# Patient Record
Sex: FEMALE | Race: BLACK OR AFRICAN AMERICAN | Employment: OTHER | URBAN - METROPOLITAN AREA
[De-identification: names, ages, dates, MRNs, and addresses within clinical notes are randomized per-mention and may not be internally consistent; named-entity substitution may affect disease eponyms.]

---

## 2017-11-15 ENCOUNTER — HOSPITAL ENCOUNTER (OUTPATIENT)
Dept: MAMMOGRAPHY | Age: 72
Discharge: HOME | End: 2017-11-15
Attending: FAMILY MEDICINE
Payer: MEDICARE

## 2017-11-15 DIAGNOSIS — Z12.39 SCREENING BREAST EXAMINATION: ICD-10-CM

## 2017-11-15 PROCEDURE — 77067 SCR MAMMO BI INCL CAD: CPT

## 2018-11-16 ENCOUNTER — HOSPITAL ENCOUNTER (OUTPATIENT)
Dept: MAMMOGRAPHY | Age: 73
Discharge: HOME | End: 2018-11-16
Attending: FAMILY MEDICINE
Payer: MEDICARE

## 2018-11-16 ENCOUNTER — HOSPITAL ENCOUNTER (OUTPATIENT)
Dept: BONE DENSITY | Age: 73
Discharge: HOME | End: 2018-11-16
Attending: FAMILY MEDICINE
Payer: MEDICARE

## 2018-11-16 DIAGNOSIS — Z12.31 VISIT FOR SCREENING MAMMOGRAM: ICD-10-CM

## 2018-11-16 DIAGNOSIS — M81.0 OSTEOPOROSIS: ICD-10-CM

## 2018-11-16 PROCEDURE — 77067 SCR MAMMO BI INCL CAD: CPT

## 2018-11-16 PROCEDURE — 77080 DXA BONE DENSITY AXIAL: CPT

## 2019-11-19 ENCOUNTER — HOSPITAL ENCOUNTER (OUTPATIENT)
Dept: MAMMOGRAPHY | Age: 74
Discharge: HOME | End: 2019-11-19
Attending: FAMILY MEDICINE
Payer: MEDICARE

## 2019-11-19 DIAGNOSIS — Z12.39 SCREENING BREAST EXAMINATION: ICD-10-CM

## 2019-11-19 PROCEDURE — 77067 SCR MAMMO BI INCL CAD: CPT

## 2020-10-16 ENCOUNTER — TRANSCRIBE ORDER (OUTPATIENT)
Dept: SCHEDULING | Age: 75
End: 2020-10-16

## 2020-10-16 DIAGNOSIS — Z12.31 VISIT FOR SCREENING MAMMOGRAM: Primary | ICD-10-CM

## 2020-10-16 DIAGNOSIS — M81.0 OSTEOPOROSIS: ICD-10-CM

## 2020-11-17 ENCOUNTER — HOSPITAL ENCOUNTER (OUTPATIENT)
Dept: BONE DENSITY | Age: 75
Discharge: HOME | End: 2020-11-17
Attending: FAMILY MEDICINE
Payer: MEDICARE

## 2020-11-17 DIAGNOSIS — Z12.31 VISIT FOR SCREENING MAMMOGRAM: ICD-10-CM

## 2020-11-17 DIAGNOSIS — M81.0 OSTEOPOROSIS: ICD-10-CM

## 2020-11-17 PROCEDURE — 77080 DXA BONE DENSITY AXIAL: CPT

## 2021-01-08 ENCOUNTER — HOSPITAL ENCOUNTER (OUTPATIENT)
Dept: MAMMOGRAPHY | Age: 76
Discharge: HOME | End: 2021-01-08
Attending: FAMILY MEDICINE
Payer: MEDICARE

## 2021-01-08 DIAGNOSIS — Z12.31 VISIT FOR SCREENING MAMMOGRAM: ICD-10-CM

## 2021-01-08 PROCEDURE — 77063 BREAST TOMOSYNTHESIS BI: CPT

## 2022-01-06 ENCOUNTER — TRANSCRIBE ORDER (OUTPATIENT)
Dept: SCHEDULING | Age: 77
End: 2022-01-06

## 2022-01-06 DIAGNOSIS — Z12.31 VISIT FOR SCREENING MAMMOGRAM: Primary | ICD-10-CM

## 2022-02-02 ENCOUNTER — HOSPITAL ENCOUNTER (OUTPATIENT)
Dept: MAMMOGRAPHY | Age: 77
Discharge: HOME | End: 2022-02-02
Attending: FAMILY MEDICINE
Payer: MEDICARE

## 2022-02-02 DIAGNOSIS — Z12.31 VISIT FOR SCREENING MAMMOGRAM: ICD-10-CM

## 2022-02-02 PROCEDURE — 77063 BREAST TOMOSYNTHESIS BI: CPT

## 2022-02-18 ENCOUNTER — HOSPITAL ENCOUNTER (OUTPATIENT)
Dept: PREADMISSION TESTING | Age: 77
Discharge: HOME | End: 2022-02-18

## 2022-04-24 ENCOUNTER — APPOINTMENT (OUTPATIENT)
Dept: GENERAL RADIOLOGY | Age: 77
DRG: 193 | End: 2022-04-24
Attending: EMERGENCY MEDICINE
Payer: MEDICARE

## 2022-04-24 ENCOUNTER — HOSPITAL ENCOUNTER (INPATIENT)
Age: 77
LOS: 3 days | Discharge: HOME | DRG: 193 | End: 2022-04-27
Attending: EMERGENCY MEDICINE | Admitting: HOSPITALIST
Payer: MEDICARE

## 2022-04-24 ENCOUNTER — APPOINTMENT (OUTPATIENT)
Dept: CT IMAGING | Age: 77
DRG: 193 | End: 2022-04-24
Attending: EMERGENCY MEDICINE
Payer: MEDICARE

## 2022-04-24 DIAGNOSIS — J11.1 INFLUENZA: ICD-10-CM

## 2022-04-24 DIAGNOSIS — J96.01 ACUTE RESPIRATORY FAILURE WITH HYPOXIA (HCC): ICD-10-CM

## 2022-04-24 DIAGNOSIS — R06.2 WHEEZING: ICD-10-CM

## 2022-04-24 DIAGNOSIS — J44.1 ACUTE EXACERBATION OF CHRONIC OBSTRUCTIVE PULMONARY DISEASE (COPD) (HCC): Primary | ICD-10-CM

## 2022-04-24 DIAGNOSIS — R06.02 SOB (SHORTNESS OF BREATH): ICD-10-CM

## 2022-04-24 DIAGNOSIS — R79.89 ELEVATED D-DIMER: ICD-10-CM

## 2022-04-24 DIAGNOSIS — E87.6 HYPOKALEMIA: ICD-10-CM

## 2022-04-24 LAB
ALBUMIN SERPL-MCNC: 3.7 G/DL (ref 3.5–5)
ALBUMIN/GLOB SERPL: 0.9 {RATIO} (ref 1.1–2.2)
ALP SERPL-CCNC: 149 U/L (ref 45–117)
ALT SERPL-CCNC: 52 U/L (ref 12–78)
ANION GAP SERPL CALC-SCNC: 7 MMOL/L (ref 5–15)
ARTERIAL PATENCY WRIST A: POSITIVE
AST SERPL-CCNC: 49 U/L (ref 15–37)
ATRIAL RATE: 75 BPM
BASE EXCESS BLD CALC-SCNC: 5.9 MMOL/L
BASOPHILS # BLD: 0 K/UL (ref 0–0.1)
BASOPHILS NFR BLD: 0 % (ref 0–1)
BDY SITE: ABNORMAL
BILIRUB SERPL-MCNC: 0.4 MG/DL (ref 0.2–1)
BNP SERPL-MCNC: 236 PG/ML
BUN SERPL-MCNC: 8 MG/DL (ref 6–20)
BUN/CREAT SERPL: 9 (ref 12–20)
CALCIUM SERPL-MCNC: 9.5 MG/DL (ref 8.5–10.1)
CALCULATED P AXIS, ECG09: 82 DEGREES
CALCULATED R AXIS, ECG10: 71 DEGREES
CALCULATED T AXIS, ECG11: 74 DEGREES
CHLORIDE SERPL-SCNC: 99 MMOL/L (ref 97–108)
CO2 SERPL-SCNC: 31 MMOL/L (ref 21–32)
COMMENT, HOLDF: NORMAL
COVID-19 RAPID TEST, COVR: NOT DETECTED
CREAT SERPL-MCNC: 0.89 MG/DL (ref 0.55–1.02)
CRP SERPL-MCNC: 5.52 MG/DL (ref 0–0.6)
D DIMER PPP FEU-MCNC: 1.42 MG/L FEU (ref 0–0.65)
DIAGNOSIS, 93000: NORMAL
DIFFERENTIAL METHOD BLD: ABNORMAL
EOSINOPHIL # BLD: 0 K/UL (ref 0–0.4)
EOSINOPHIL NFR BLD: 0 % (ref 0–7)
ERYTHROCYTE [DISTWIDTH] IN BLOOD BY AUTOMATED COUNT: 15.1 % (ref 11.5–14.5)
GAS FLOW.O2 O2 DELIVERY SYS: ABNORMAL L/MIN
GLOBULIN SER CALC-MCNC: 3.9 G/DL (ref 2–4)
GLUCOSE SERPL-MCNC: 119 MG/DL (ref 65–100)
HCO3 BLD-SCNC: 29.1 MMOL/L (ref 22–26)
HCT VFR BLD AUTO: 39.9 % (ref 35–47)
HGB BLD-MCNC: 13.2 G/DL (ref 11.5–16)
IMM GRANULOCYTES # BLD AUTO: 0 K/UL
IMM GRANULOCYTES NFR BLD AUTO: 0 %
LYMPHOCYTES # BLD: 2.2 K/UL (ref 0.8–3.5)
LYMPHOCYTES NFR BLD: 45 % (ref 12–49)
MCH RBC QN AUTO: 29.1 PG (ref 26–34)
MCHC RBC AUTO-ENTMCNC: 33.1 G/DL (ref 30–36.5)
MCV RBC AUTO: 87.9 FL (ref 80–99)
MONOCYTES # BLD: 0.4 K/UL (ref 0–1)
MONOCYTES NFR BLD: 8 % (ref 5–13)
NEUTS SEG # BLD: 2.3 K/UL (ref 1.8–8)
NEUTS SEG NFR BLD: 47 % (ref 32–75)
NRBC # BLD: 0 K/UL (ref 0–0.01)
NRBC BLD-RTO: 0 PER 100 WBC
P-R INTERVAL, ECG05: 264 MS
PCO2 BLD: 36.5 MMHG (ref 35–45)
PH BLD: 7.51 [PH] (ref 7.35–7.45)
PLATELET # BLD AUTO: 205 K/UL (ref 150–400)
PMV BLD AUTO: 10.7 FL (ref 8.9–12.9)
PO2 BLD: 67 MMHG (ref 80–100)
POTASSIUM SERPL-SCNC: 2.9 MMOL/L (ref 3.5–5.1)
PROCALCITONIN SERPL-MCNC: <0.05 NG/ML
PROT SERPL-MCNC: 7.6 G/DL (ref 6.4–8.2)
Q-T INTERVAL, ECG07: 404 MS
QRS DURATION, ECG06: 78 MS
QTC CALCULATION (BEZET), ECG08: 451 MS
RBC # BLD AUTO: 4.54 M/UL (ref 3.8–5.2)
RBC MORPH BLD: ABNORMAL
SAMPLES BEING HELD,HOLD: NORMAL
SAO2 % BLD: 94.8 % (ref 92–97)
SODIUM SERPL-SCNC: 137 MMOL/L (ref 136–145)
SOURCE, COVRS: NORMAL
SPECIMEN TYPE: ABNORMAL
TROPONIN-HIGH SENSITIVITY: 14 NG/L (ref 0–51)
VENTRICULAR RATE, ECG03: 75 BPM
WBC # BLD AUTO: 4.9 K/UL (ref 3.6–11)

## 2022-04-24 PROCEDURE — 86140 C-REACTIVE PROTEIN: CPT

## 2022-04-24 PROCEDURE — 93005 ELECTROCARDIOGRAM TRACING: CPT

## 2022-04-24 PROCEDURE — 94664 DEMO&/EVAL PT USE INHALER: CPT

## 2022-04-24 PROCEDURE — 71275 CT ANGIOGRAPHY CHEST: CPT

## 2022-04-24 PROCEDURE — 82803 BLOOD GASES ANY COMBINATION: CPT

## 2022-04-24 PROCEDURE — 99285 EMERGENCY DEPT VISIT HI MDM: CPT

## 2022-04-24 PROCEDURE — 36600 WITHDRAWAL OF ARTERIAL BLOOD: CPT

## 2022-04-24 PROCEDURE — 94640 AIRWAY INHALATION TREATMENT: CPT

## 2022-04-24 PROCEDURE — 74011250637 HC RX REV CODE- 250/637: Performed by: EMERGENCY MEDICINE

## 2022-04-24 PROCEDURE — 74011250637 HC RX REV CODE- 250/637: Performed by: NURSE PRACTITIONER

## 2022-04-24 PROCEDURE — 87635 SARS-COV-2 COVID-19 AMP PRB: CPT

## 2022-04-24 PROCEDURE — 74011000636 HC RX REV CODE- 636: Performed by: RADIOLOGY

## 2022-04-24 PROCEDURE — 74011250637 HC RX REV CODE- 250/637: Performed by: HOSPITALIST

## 2022-04-24 PROCEDURE — 84145 PROCALCITONIN (PCT): CPT

## 2022-04-24 PROCEDURE — 85025 COMPLETE CBC W/AUTO DIFF WBC: CPT

## 2022-04-24 PROCEDURE — 84484 ASSAY OF TROPONIN QUANT: CPT

## 2022-04-24 PROCEDURE — 77010033678 HC OXYGEN DAILY

## 2022-04-24 PROCEDURE — 83880 ASSAY OF NATRIURETIC PEPTIDE: CPT

## 2022-04-24 PROCEDURE — 71045 X-RAY EXAM CHEST 1 VIEW: CPT

## 2022-04-24 PROCEDURE — 74011250636 HC RX REV CODE- 250/636: Performed by: HOSPITALIST

## 2022-04-24 PROCEDURE — 74011000250 HC RX REV CODE- 250: Performed by: HOSPITALIST

## 2022-04-24 PROCEDURE — 85379 FIBRIN DEGRADATION QUANT: CPT

## 2022-04-24 PROCEDURE — 74011250636 HC RX REV CODE- 250/636: Performed by: EMERGENCY MEDICINE

## 2022-04-24 PROCEDURE — 36415 COLL VENOUS BLD VENIPUNCTURE: CPT

## 2022-04-24 PROCEDURE — 65270000029 HC RM PRIVATE

## 2022-04-24 PROCEDURE — 80053 COMPREHEN METABOLIC PANEL: CPT

## 2022-04-24 PROCEDURE — 96374 THER/PROPH/DIAG INJ IV PUSH: CPT

## 2022-04-24 PROCEDURE — 74011000250 HC RX REV CODE- 250: Performed by: EMERGENCY MEDICINE

## 2022-04-24 RX ORDER — ACETAMINOPHEN 325 MG/1
650 TABLET ORAL
Status: DISCONTINUED | OUTPATIENT
Start: 2022-04-24 | End: 2022-04-27 | Stop reason: HOSPADM

## 2022-04-24 RX ORDER — POTASSIUM CHLORIDE 750 MG/1
40 TABLET, FILM COATED, EXTENDED RELEASE ORAL
Status: COMPLETED | OUTPATIENT
Start: 2022-04-24 | End: 2022-04-24

## 2022-04-24 RX ORDER — GUAIFENESIN 600 MG/1
1200 TABLET, EXTENDED RELEASE ORAL EVERY 12 HOURS
Status: DISCONTINUED | OUTPATIENT
Start: 2022-04-24 | End: 2022-04-27 | Stop reason: HOSPADM

## 2022-04-24 RX ORDER — SODIUM CHLORIDE 0.9 % (FLUSH) 0.9 %
5-40 SYRINGE (ML) INJECTION AS NEEDED
Status: DISCONTINUED | OUTPATIENT
Start: 2022-04-24 | End: 2022-04-27 | Stop reason: HOSPADM

## 2022-04-24 RX ORDER — ONDANSETRON 2 MG/ML
4 INJECTION INTRAMUSCULAR; INTRAVENOUS
Status: DISCONTINUED | OUTPATIENT
Start: 2022-04-24 | End: 2022-04-27 | Stop reason: HOSPADM

## 2022-04-24 RX ORDER — ONDANSETRON 4 MG/1
4 TABLET, ORALLY DISINTEGRATING ORAL
Status: DISCONTINUED | OUTPATIENT
Start: 2022-04-24 | End: 2022-04-27 | Stop reason: HOSPADM

## 2022-04-24 RX ORDER — POLYETHYLENE GLYCOL 3350 17 G/17G
17 POWDER, FOR SOLUTION ORAL DAILY PRN
Status: DISCONTINUED | OUTPATIENT
Start: 2022-04-24 | End: 2022-04-27 | Stop reason: HOSPADM

## 2022-04-24 RX ORDER — BENZONATATE 100 MG/1
200 CAPSULE ORAL EVERY 8 HOURS
Status: DISCONTINUED | OUTPATIENT
Start: 2022-04-24 | End: 2022-04-27 | Stop reason: HOSPADM

## 2022-04-24 RX ORDER — OSELTAMIVIR PHOSPHATE 75 MG/1
75 CAPSULE ORAL 2 TIMES DAILY
Status: DISCONTINUED | OUTPATIENT
Start: 2022-04-24 | End: 2022-04-25

## 2022-04-24 RX ORDER — ALBUTEROL SULFATE 90 UG/1
2 AEROSOL, METERED RESPIRATORY (INHALATION)
Status: DISCONTINUED | OUTPATIENT
Start: 2022-04-24 | End: 2022-04-24

## 2022-04-24 RX ORDER — DEXAMETHASONE SODIUM PHOSPHATE 4 MG/ML
10 INJECTION, SOLUTION INTRA-ARTICULAR; INTRALESIONAL; INTRAMUSCULAR; INTRAVENOUS; SOFT TISSUE
Status: COMPLETED | OUTPATIENT
Start: 2022-04-24 | End: 2022-04-24

## 2022-04-24 RX ORDER — ARFORMOTEROL TARTRATE 15 UG/2ML
15 SOLUTION RESPIRATORY (INHALATION)
Status: DISCONTINUED | OUTPATIENT
Start: 2022-04-24 | End: 2022-04-27 | Stop reason: HOSPADM

## 2022-04-24 RX ORDER — BUDESONIDE 0.5 MG/2ML
500 INHALANT ORAL
Status: DISCONTINUED | OUTPATIENT
Start: 2022-04-24 | End: 2022-04-27 | Stop reason: HOSPADM

## 2022-04-24 RX ORDER — HYDRALAZINE HYDROCHLORIDE 10 MG/1
10 TABLET, FILM COATED ORAL
Status: DISCONTINUED | OUTPATIENT
Start: 2022-04-24 | End: 2022-04-27 | Stop reason: HOSPADM

## 2022-04-24 RX ORDER — ACETAMINOPHEN 650 MG/1
650 SUPPOSITORY RECTAL
Status: DISCONTINUED | OUTPATIENT
Start: 2022-04-24 | End: 2022-04-27 | Stop reason: HOSPADM

## 2022-04-24 RX ORDER — SODIUM CHLORIDE 0.9 % (FLUSH) 0.9 %
5-40 SYRINGE (ML) INJECTION EVERY 8 HOURS
Status: DISCONTINUED | OUTPATIENT
Start: 2022-04-24 | End: 2022-04-27 | Stop reason: HOSPADM

## 2022-04-24 RX ORDER — IPRATROPIUM BROMIDE AND ALBUTEROL SULFATE 2.5; .5 MG/3ML; MG/3ML
3 SOLUTION RESPIRATORY (INHALATION)
Status: DISCONTINUED | OUTPATIENT
Start: 2022-04-24 | End: 2022-04-26

## 2022-04-24 RX ORDER — ENOXAPARIN SODIUM 100 MG/ML
40 INJECTION SUBCUTANEOUS DAILY
Status: DISCONTINUED | OUTPATIENT
Start: 2022-04-25 | End: 2022-04-27 | Stop reason: HOSPADM

## 2022-04-24 RX ADMIN — BENZONATATE 200 MG: 100 CAPSULE ORAL at 15:51

## 2022-04-24 RX ADMIN — OSELTAMIVIR PHOSPHATE 75 MG: 75 CAPSULE ORAL at 19:03

## 2022-04-24 RX ADMIN — ARFORMOTEROL TARTRATE 15 MCG: 15 SOLUTION RESPIRATORY (INHALATION) at 20:46

## 2022-04-24 RX ADMIN — SODIUM CHLORIDE, PRESERVATIVE FREE 10 ML: 5 INJECTION INTRAVENOUS at 15:51

## 2022-04-24 RX ADMIN — AZITHROMYCIN DIHYDRATE 500 MG: 500 INJECTION, POWDER, LYOPHILIZED, FOR SOLUTION INTRAVENOUS at 15:51

## 2022-04-24 RX ADMIN — IOPAMIDOL 80 ML: 755 INJECTION, SOLUTION INTRAVENOUS at 14:47

## 2022-04-24 RX ADMIN — METHYLPREDNISOLONE SODIUM SUCCINATE 60 MG: 40 INJECTION, POWDER, FOR SOLUTION INTRAMUSCULAR; INTRAVENOUS at 15:51

## 2022-04-24 RX ADMIN — HYDRALAZINE HYDROCHLORIDE 10 MG: 10 TABLET, FILM COATED ORAL at 21:42

## 2022-04-24 RX ADMIN — ALBUTEROL SULFATE 1 DOSE: 2.5 SOLUTION RESPIRATORY (INHALATION) at 15:34

## 2022-04-24 RX ADMIN — BUDESONIDE 500 MCG: 0.5 INHALANT RESPIRATORY (INHALATION) at 20:46

## 2022-04-24 RX ADMIN — METHYLPREDNISOLONE SODIUM SUCCINATE 60 MG: 40 INJECTION, POWDER, FOR SOLUTION INTRAMUSCULAR; INTRAVENOUS at 21:42

## 2022-04-24 RX ADMIN — IPRATROPIUM BROMIDE AND ALBUTEROL SULFATE 3 ML: .5; 3 SOLUTION RESPIRATORY (INHALATION) at 20:46

## 2022-04-24 RX ADMIN — ALBUTEROL SULFATE 1 DOSE: 2.5 SOLUTION RESPIRATORY (INHALATION) at 12:46

## 2022-04-24 RX ADMIN — BENZONATATE 200 MG: 100 CAPSULE ORAL at 21:42

## 2022-04-24 RX ADMIN — SODIUM CHLORIDE, PRESERVATIVE FREE 10 ML: 5 INJECTION INTRAVENOUS at 21:43

## 2022-04-24 RX ADMIN — POTASSIUM CHLORIDE 40 MEQ: 750 TABLET, EXTENDED RELEASE ORAL at 13:47

## 2022-04-24 RX ADMIN — DEXAMETHASONE SODIUM PHOSPHATE 10 MG: 4 INJECTION, SOLUTION INTRAMUSCULAR; INTRAVENOUS at 12:45

## 2022-04-24 RX ADMIN — GUAIFENESIN 1200 MG: 600 TABLET, EXTENDED RELEASE ORAL at 21:42

## 2022-04-24 NOTE — ED NOTES
Has a final transfer review been performed? Yes    Reason for Admission: Flu +, respiratory distress  Patient comes from: Home  Mental Status: Alert and oriented  ADL:partial assistance  Ambulation:mild difficulty, patient gets SOB on exertion but steady on feet  Pertinent Info/Safety Concerns: Patient is on 1L nasal cannula  COVID Status: Comments: rapid COVID pending  MEWS Score: 1  Vitals:    04/24/22 1322 04/24/22 1337 04/24/22 1534 04/24/22 1558   BP: (!) 143/83 (!) 148/87  (!) 153/92   Pulse: 77 83  77   Resp: 21 19 18   Temp:    98.7 °F (37.1 °C)   SpO2: 93% 94% 95% 91%   Weight:       Height:         Transport mode:ED stretcher    TRANSFER - OUT REPORT:    Report given to 6E RN(name) on Rogelio Levy  being transferred to 6E(unit) for routine progression of care       Report consisted of patient's Situation, Background, Assessment and   Recommendations(SBAR). Information from the following report(s) ED Summary was reviewed with the receiving nurse. Lines:   Peripheral IV 04/24/22 Right Antecubital (Active)   Site Assessment Clean, dry, & intact 04/24/22 1227   Phlebitis Assessment 0 04/24/22 1227   Infiltration Assessment 0 04/24/22 1227   Dressing Status Clean, dry, & intact 04/24/22 1227        Opportunity for questions and clarification was provided.

## 2022-04-24 NOTE — PROGRESS NOTES
TRANSFER - IN REPORT:    Verbal report received from ED Thru-put Note on Bradford Marts  being received from ED(unit) for routine progression of care      Report consisted of patients Situation, Background, Assessment and   Recommendations(SBAR). Information from the following report(s) SBAR and Kardex was reviewed with the receiving nurse. Opportunity for questions and clarification was provided. Assessment completed upon patients arrival to unit and care assumed.

## 2022-04-24 NOTE — ED TRIAGE NOTES
Pt arrives via wheelchair c/o (saw PCP Wed) c/o headache, cough, neg COVID, FLU +. Today cc is SOB. Pt endorses smoking less than pack/week. Pt 91% in triage on ra. Plt placed on 2L, now 95%. Pt AOx4.

## 2022-04-24 NOTE — ED PROVIDER NOTES
48-year-old female with history of COPD, on no home O2, presents to the emergency department stating that last Wednesday she was diagnosed with influenza. At that time she was COVID-negative and she was presenting with headache, cough, myalgias and diarrhea. With this cough she has had progressively worsening shortness of breath and wheezing unalleviated by her home albuterol inhalers. Denies any nausea, vomiting, chest pain, fever, rhinorrhea or sore throat. No past medical history on file. Past Surgical History:   Procedure Laterality Date    COLONOSCOPY N/A 9/20/2016    COLONOSCOPY performed by Portia Bagley MD at [address] [facility]; HI RISK IND  9/20/2016         HX HYSTERECTOMY      HX OTHER SURGICAL      colonoscopy    [facility] COLSC FLX W/RMVL OF TUMOR POLYP LESION SNARE TQ  7/20/2011              Family History:   Problem Relation Age of Onset    Heart Disease Mother     Other Father         AAA    Cancer Brother        Social History     Socioeconomic History    Marital status:      Spouse name: Not on file    Number of children: Not on file    Years of education: Not on file    Highest education level: Not on file   Occupational History    Not on file   Tobacco Use    Smoking status: Current Every Day Smoker     Packs/day: 0.50     Years: 20.00     Pack years: 10.00    Smokeless tobacco: Never Used   Substance and Sexual Activity    Alcohol use: No    Drug use: No    Sexual activity: Not on file   Other Topics Concern    Not on file   Social History Narrative    Not on file     Social Determinants of Health     Financial Resource Strain:     Difficulty of Paying Living Expenses: Not on file   Food Insecurity:     Worried About Running Out of Food in the Last Year: Not on file    Carey of Food in the Last Year: Not on file   Transportation Needs:     Lack of Transportation (Medical):  Not on file    Lack of Transportation (Non-Medical): Not on file   Physical Activity:     Days of Exercise per Week: Not on file    Minutes of Exercise per Session: Not on file   Stress:     Feeling of Stress : Not on file   Social Connections:     Frequency of Communication with Friends and Family: Not on file    Frequency of Social Gatherings with Friends and Family: Not on file    Attends Zoroastrianism Services: Not on file    Active Member of [address] [place] [address] or Organizations: Not on file    Attends Club or Organization Meetings: Not on file    Marital Status: Not on file   Intimate Partner Violence:     Fear of Current or Ex-Partner: Not on file    Emotionally Abused: Not on file    Physically Abused: Not on file    Sexually Abused: Not on file   Housing Stability:     Unable to Pay for Housing in the Last Year: Not on file    Number of [place] in the Last Year: Not on file    Unstable Housing in the Last Year: Not on file         ALLERGIES: Patient has no known allergies. Review of Systems   Constitutional: Positive for activity change. Negative for appetite change, chills and fever. HENT: Negative for congestion, rhinorrhea, sinus pressure, sneezing and sore throat. Eyes: Negative for photophobia and visual disturbance. Respiratory: Positive for cough, chest tightness, shortness of breath and wheezing. Cardiovascular: Negative for chest pain. Gastrointestinal: Positive for diarrhea. Negative for abdominal pain, blood in stool, constipation, nausea and vomiting. Genitourinary: Negative for difficulty urinating, dysuria, flank pain, frequency, hematuria, menstrual problem, urgency, vaginal bleeding and vaginal discharge. Musculoskeletal: Positive for myalgias. Negative for arthralgias, back pain and neck pain. Skin: Negative for rash and wound. Neurological: Positive for headaches. Negative for syncope, weakness and numbness. Psychiatric/Behavioral: Negative for self-injury and suicidal ideas.    All other systems reviewed and are negative. Vitals:    04/24/22 1207   BP: (!) 142/87   Pulse: 77   Resp: 16   Temp: 98 °F (36.7 °C)   SpO2: 91%   Weight: 59 kg (130 lb)   Height: 5' 6\" (1.676 m)            Physical Exam  Vitals and nursing note reviewed. Constitutional:       General: She is not in acute distress. Appearance: Normal appearance. She is well-developed. She is ill-appearing. She is not diaphoretic. HENT:      Head: Normocephalic and atraumatic. Nose: Nose normal.   Eyes:      Extraocular Movements: Extraocular movements intact. Conjunctiva/sclera: Conjunctivae normal.      Pupils: Pupils are equal, round, and reactive to light. Cardiovascular:      Rate and Rhythm: Normal rate and regular rhythm. Heart sounds: Normal heart sounds. Pulmonary:      Effort: Pulmonary effort is normal.      Breath sounds: Decreased breath sounds and wheezing present. Abdominal:      General: There is no distension. Palpations: Abdomen is soft. Tenderness: There is no abdominal tenderness. Musculoskeletal:         General: No tenderness. Cervical back: Neck supple. Right lower leg: No tenderness. No edema. Left lower leg: No edema. Skin:     General: Skin is warm and dry. Neurological:      General: No focal deficit present. Mental Status: She is alert and oriented to person, place, and time. Cranial Nerves: No cranial nerve deficit. Sensory: No sensory deficit. Motor: No weakness. Coordination: Coordination normal.          MDM   57-year-old female presents with worsening cough, shortness of breath, wheezing with recent diagnosed with influenza last week. She was seen to desaturate to 88% on room air and placed on nasal cannula O2 with improvement up into the mid 90s. Labs returned showing pH 7.51, CO2 36.5, O2 67, bicarb 29.1, no leukocytosis or anemia, K low at 2.9, will replete orally, negative troponin and BNP, with elevated D-dimer 1.42.   We will pursue CTA chest to further evaluate. Elevated CRP at 5.52. Procedures  7033 EKG shows normal sinus rhythm with a rate of 75 bpm with PACs no acute ST or T wave abnormalities suggestive of ischemia. Perfect Serve Consult for Admission  2:58 PM    ED Room Number: RL65/17  Patient Name and age:  Pedro Nearing 67 y.o.  female  Working Diagnosis:   1. Acute exacerbation of chronic obstructive pulmonary disease (COPD) ([address]    2. Influenza    3. SOB (shortness of breath)    4. Wheezing    5. Elevated d-dimer    6. Acute respiratory failure with hypoxia (HCC)    7. Hypokalemia        COVID-19 Suspicion:  no  Sepsis present:  no  Reassessment needed: no  Code Status:  Full Code  Readmission: no  Isolation Requirements:  yes  Recommended Level of Care:  telemetry  Department:[facility] ED - 21   Other:  67 y.o. female dx with the flu last week, covid neg, presents with worsening shortness of breath, wheezing consistent with COPD type exacerbation. Given DuoNeb and IV Decadron. Found to be hypoxic on room air to 88, stable on nasal cannula O2. Mildly elevated D-dimer, CTA chest ordered.

## 2022-04-24 NOTE — H&P
[facility] Adult  Hospitalist Group  History and Physical    Date of Service:  4/24/2022  Primary Care Provider: Karan Multani MD  Source of information: The patient    Chief Complaint: Shortness of Breath      History of Presenting Illness:   [place] is a 67 y.o. female who presents with SOB     65-year-old -American female active smoking, history of COPD arrives via wheelchair c/o (saw PCP Wed) c/o headache, cough, and shortness breath. Patient said during the Easter weekend, met her family member one of her sisters had a flu, and after that then she also developed a chills, dry cough, she went to her PCPs office on Wednesday, tested neg COVID, FLU +. She was giving Tamiflu . Denied any fever, but diffuse increasing coughing, and today feels increasing shortness of breath SOB. Pt endorses smoking less than pack/week. Her last smoking was 2 days ago . pt 91% in triage on ra. Plt placed on 2L, now 95%. Pt AOx4. Efrain Srivastava REVIEW OF SYSTEMS:  General: HPI, no changes of weight  HEENT: no headache, no vision changes, no nose discharge, no hearing changes   RES: HPI,   CVS: no cp, no palpitation. Muscular: no joint swelling, no muscle pain, no leg swelling  Skin: no rash, no itching   GI: no vomiting, mild diarrhea last 2-3 days, no subsided   : no dysuria, no hematuria  Hemo: no gum bleeding, no petechial   Neuro: no sensation changes, no focal weakness   Endo: no polydipsia   Psych: denied depression     No past medical history on file. Past Surgical History:   Procedure Laterality Date    COLONOSCOPY N/A 9/20/2016    COLONOSCOPY performed by Theodora Prieto MD at [facility]; HI RISK IND  9/20/2016         HX HYSTERECTOMY      HX OTHER SURGICAL      colonoscopy    [facility] COLSC FLX W/RMVL OF TUMOR POLYP LESION SNARE TQ  7/20/2011          Prior to Admission medications    Medication Sig Start Date End Date Taking?  Authorizing Provider albuterol (VENTOLIN HFA) 90 mcg/actuation inhaler Take 2 Puffs by inhalation every four (4) hours as needed for Wheezing. Provider, Historical     No Known Allergies   Family History   Problem Relation Age of Onset    Heart Disease Mother     Other Father         AAA    Cancer Brother       Social History:  reports that she has been smoking. She has a 10.00 pack-year smoking history. She has never used smokeless tobacco. She reports that she does not drink alcohol and does not use drugs. Family and social history were personally reviewed, all pertinent and relevant details are outlined as above. Objective:     Visit Vitals  BP (!) 148/87   Pulse 83   Temp 98 °F (36.7 °C)   Resp 19   Ht 5' 6\" (1.676 m)   Wt 59 kg (130 lb)   SpO2 94%   BMI 20.98 kg/m²    O2 Flow Rate (L/min): 1 l/min O2 Device: Nasal cannula    PHYSICAL EXAM:   General: Alert x oriented x 3, awake, no acute distresss   HEENT: PEERL, EOMI, moist mucus membranes  Neck: Supple, no JVD, no meningeal signs  Chest:AE ok, course bs, no wheezing. CVS: RRR, S1 S2 heard, no murmurs/rubs/gallops  Abd: Soft, non-tender, non-distended, +bowel sounds   Ext: No clubbing, no cyanosis, no edema  Neuro/Psych: Pleasant mood and affect, CN 2-12 grossly intact, sensory grossly within normal limit, Strength 5/5 in all extremities, DTR 1+ x 4  Cap refill: Brisk, less than 3 seconds  Pulses: 2+, symmetric in all extremities  Skin: Warm, dry, without rashes or lesions    Data Review: All diagnostic labs and studies have been reviewed. Abnormal Labs Reviewed   CBC WITH AUTOMATED DIFF - Abnormal; Notable for the following components:       Result Value    RDW 15.1 (*)     All other components within normal limits   METABOLIC PANEL, COMPREHENSIVE - Abnormal; Notable for the following components:    Potassium 2.9 (*)     Glucose 119 (*)     BUN/Creatinine ratio 9 (*)     AST (SGOT) 49 (*)     Alk.  phosphatase 149 (*)     A-G Ratio 0.9 (*)     All other components within normal limits   D DIMER - Abnormal; Notable for the following components:    D-dimer 1.42 (*)     All other components within normal limits   C REACTIVE PROTEIN, QT - Abnormal; Notable for the following components:    C-Reactive protein 5.52 (*)     All other components within normal limits   POC G3 - PUL - Abnormal; Notable for the following components:    pH (POC) 7.51 (*)     pO2 (POC) 67 (*)     HCO3 (POC) 29.1 (*)     All other components within normal limits       All Micro Results     Procedure Component Value Units Date/Time    COVID-19 WITH INFLUENZA A/B [[id number]]     Order Status: Sent Specimen: Nasopharyngeal           IMAGING:   XR CHEST PORT   Final Result   Pulmonary edema favored over atypical infection. CTA CHEST W OR W WO CONT    (Results Pending)        ECG/ECHO:    Results for orders placed or performed during the hospital encounter of 04/24/22   EKG, 12 LEAD, INITIAL   Result Value Ref Range    Ventricular Rate 75 BPM    Atrial Rate 75 BPM    P-R Interval 264 ms    QRS Duration 78 ms    Q-T Interval 404 ms    QTC Calculation (Bezet) 451 ms    Calculated P Axis 82 degrees    Calculated R Axis 71 degrees    Calculated T Axis 74 degrees    Diagnosis       Sinus rhythm with 1st degree AV block with premature supraventricular   complexes  Minimal voltage criteria for LVH, may be normal variant ( Sokolow-Lucio )  Borderline ECG  When compared with ECG of 12-OCT-2006 10:33,  premature supraventricular complexes are now present          Assessment:   Given the patient's current clinical presentation, there is a high level of concern for decompensation if discharged from the emergency department. Complex decision making was performed, which includes reviewing the patient's available past medical records, laboratory results, and imaging studies. Active Problems:    COPD exacerbation ([facility] (4/24/2022)      Plan:     1.  COPD exacerbation: due to flu, will continue tamiflu, will repeat COVID 19 due to the pandemic. Pt is fully vaccinated and also has booster. Start azithromycin, iv solumedrol. Albuterol. Mucinex. 2. Hypokalemia: give po kcl. And follow         DIET: ADULT DIET Regular   ISOLATION PRECAUTIONS: There are currently no Active Isolations  CODE STATUS: Full Code   DVT PROPHYLAXIS: SCDs  FUNCTIONAL STATUS PRIOR TO HOSPITALIZATION: Fully active and ambulatory; able to carry on all self-care without restriction. EARLY MOBILITY ASSESSMENT: Recommend routine ambulation while hospitalized with the assistance of nursing staff  ANTICIPATED DISCHARGE: 24-48 hours. EMERGENCY CONTACT/SURROGATE DECISION MAKER: pt makes her own decision     CRITICAL CARE WAS PERFORMED FOR THIS ENCOUNTER: NO.      Signed By: Kevin River MD     April 24, 2022         Please note that this dictation may have been completed with Dragon, the [employer] voice recognition software. Quite often unanticipated grammatical, syntax, homophones, and other interpretive errors are inadvertently transcribed by the computer software. Please disregard these errors. Please excuse any errors that have escaped final proofreading.

## 2022-04-25 LAB
ALBUMIN SERPL-MCNC: 3.2 G/DL (ref 3.5–5)
ALBUMIN/GLOB SERPL: 0.8 {RATIO} (ref 1.1–2.2)
ALP SERPL-CCNC: 144 U/L (ref 45–117)
ALT SERPL-CCNC: 47 U/L (ref 12–78)
ANION GAP SERPL CALC-SCNC: 7 MMOL/L (ref 5–15)
AST SERPL-CCNC: 33 U/L (ref 15–37)
BASOPHILS # BLD: 0 K/UL (ref 0–0.1)
BASOPHILS NFR BLD: 0 % (ref 0–1)
BILIRUB SERPL-MCNC: 0.4 MG/DL (ref 0.2–1)
BUN SERPL-MCNC: 12 MG/DL (ref 6–20)
BUN/CREAT SERPL: 16 (ref 12–20)
CALCIUM SERPL-MCNC: 9.3 MG/DL (ref 8.5–10.1)
CHLORIDE SERPL-SCNC: 103 MMOL/L (ref 97–108)
CO2 SERPL-SCNC: 28 MMOL/L (ref 21–32)
CREAT SERPL-MCNC: 0.77 MG/DL (ref 0.55–1.02)
DIFFERENTIAL METHOD BLD: ABNORMAL
EOSINOPHIL # BLD: 0 K/UL (ref 0–0.4)
EOSINOPHIL NFR BLD: 0 % (ref 0–7)
ERYTHROCYTE [DISTWIDTH] IN BLOOD BY AUTOMATED COUNT: 14.9 % (ref 11.5–14.5)
GLOBULIN SER CALC-MCNC: 4 G/DL (ref 2–4)
GLUCOSE SERPL-MCNC: 178 MG/DL (ref 65–100)
HCT VFR BLD AUTO: 38 % (ref 35–47)
HGB BLD-MCNC: 12.4 G/DL (ref 11.5–16)
IMM GRANULOCYTES # BLD AUTO: 0 K/UL
IMM GRANULOCYTES NFR BLD AUTO: 0 %
LYMPHOCYTES # BLD: 0.6 K/UL (ref 0.8–3.5)
LYMPHOCYTES NFR BLD: 19 % (ref 12–49)
MAGNESIUM SERPL-MCNC: 2 MG/DL (ref 1.6–2.4)
MCH RBC QN AUTO: 28.8 PG (ref 26–34)
MCHC RBC AUTO-ENTMCNC: 32.6 G/DL (ref 30–36.5)
MCV RBC AUTO: 88.2 FL (ref 80–99)
MONOCYTES # BLD: 0 K/UL (ref 0–1)
MONOCYTES NFR BLD: 1 % (ref 5–13)
NEUTS SEG # BLD: 2.6 K/UL (ref 1.8–8)
NEUTS SEG NFR BLD: 80 % (ref 32–75)
NRBC # BLD: 0 K/UL (ref 0–0.01)
NRBC BLD-RTO: 0 PER 100 WBC
PHOSPHATE SERPL-MCNC: 2 MG/DL (ref 2.6–4.7)
PLATELET # BLD AUTO: 221 K/UL (ref 150–400)
PMV BLD AUTO: 10.5 FL (ref 8.9–12.9)
POTASSIUM SERPL-SCNC: 3.6 MMOL/L (ref 3.5–5.1)
PROT SERPL-MCNC: 7.2 G/DL (ref 6.4–8.2)
RBC # BLD AUTO: 4.31 M/UL (ref 3.8–5.2)
RBC MORPH BLD: ABNORMAL
SODIUM SERPL-SCNC: 138 MMOL/L (ref 136–145)
WBC # BLD AUTO: 3.2 K/UL (ref 3.6–11)
WBC MORPH BLD: ABNORMAL

## 2022-04-25 PROCEDURE — 36415 COLL VENOUS BLD VENIPUNCTURE: CPT

## 2022-04-25 PROCEDURE — 74011250636 HC RX REV CODE- 250/636: Performed by: INTERNAL MEDICINE

## 2022-04-25 PROCEDURE — 94640 AIRWAY INHALATION TREATMENT: CPT

## 2022-04-25 PROCEDURE — 84100 ASSAY OF PHOSPHORUS: CPT

## 2022-04-25 PROCEDURE — 65270000029 HC RM PRIVATE

## 2022-04-25 PROCEDURE — 83735 ASSAY OF MAGNESIUM: CPT

## 2022-04-25 PROCEDURE — 80053 COMPREHEN METABOLIC PANEL: CPT

## 2022-04-25 PROCEDURE — 74011000250 HC RX REV CODE- 250: Performed by: HOSPITALIST

## 2022-04-25 PROCEDURE — 94760 N-INVAS EAR/PLS OXIMETRY 1: CPT

## 2022-04-25 PROCEDURE — 85025 COMPLETE CBC W/AUTO DIFF WBC: CPT

## 2022-04-25 PROCEDURE — 74011250637 HC RX REV CODE- 250/637: Performed by: INTERNAL MEDICINE

## 2022-04-25 PROCEDURE — 74011250636 HC RX REV CODE- 250/636: Performed by: HOSPITALIST

## 2022-04-25 PROCEDURE — 74011250637 HC RX REV CODE- 250/637: Performed by: HOSPITALIST

## 2022-04-25 RX ORDER — QUETIAPINE FUMARATE 25 MG/1
25 TABLET, FILM COATED ORAL ONCE
Status: DISCONTINUED | OUTPATIENT
Start: 2022-04-25 | End: 2022-04-25

## 2022-04-25 RX ORDER — OSELTAMIVIR PHOSPHATE 30 MG/1
30 CAPSULE ORAL 2 TIMES DAILY
Status: DISCONTINUED | OUTPATIENT
Start: 2022-04-25 | End: 2022-04-27 | Stop reason: HOSPADM

## 2022-04-25 RX ADMIN — ENOXAPARIN SODIUM 40 MG: 100 INJECTION SUBCUTANEOUS at 09:09

## 2022-04-25 RX ADMIN — BENZONATATE 200 MG: 100 CAPSULE ORAL at 21:19

## 2022-04-25 RX ADMIN — AZITHROMYCIN DIHYDRATE 500 MG: 500 INJECTION, POWDER, LYOPHILIZED, FOR SOLUTION INTRAVENOUS at 17:22

## 2022-04-25 RX ADMIN — BUDESONIDE 500 MCG: 0.5 INHALANT RESPIRATORY (INHALATION) at 07:19

## 2022-04-25 RX ADMIN — IPRATROPIUM BROMIDE AND ALBUTEROL SULFATE 3 ML: .5; 3 SOLUTION RESPIRATORY (INHALATION) at 07:19

## 2022-04-25 RX ADMIN — METHYLPREDNISOLONE SODIUM SUCCINATE 60 MG: 40 INJECTION, POWDER, FOR SOLUTION INTRAMUSCULAR; INTRAVENOUS at 09:09

## 2022-04-25 RX ADMIN — ARFORMOTEROL TARTRATE 15 MCG: 15 SOLUTION RESPIRATORY (INHALATION) at 07:19

## 2022-04-25 RX ADMIN — SODIUM CHLORIDE, PRESERVATIVE FREE 10 ML: 5 INJECTION INTRAVENOUS at 05:16

## 2022-04-25 RX ADMIN — ARFORMOTEROL TARTRATE 15 MCG: 15 SOLUTION RESPIRATORY (INHALATION) at 20:49

## 2022-04-25 RX ADMIN — OSELTAMIVIR PHOSPHATE 30 MG: 30 CAPSULE ORAL at 17:26

## 2022-04-25 RX ADMIN — IPRATROPIUM BROMIDE AND ALBUTEROL SULFATE 3 ML: .5; 3 SOLUTION RESPIRATORY (INHALATION) at 02:00

## 2022-04-25 RX ADMIN — OSELTAMIVIR PHOSPHATE 30 MG: 30 CAPSULE ORAL at 09:06

## 2022-04-25 RX ADMIN — METHYLPREDNISOLONE SODIUM SUCCINATE 40 MG: 40 INJECTION, POWDER, FOR SOLUTION INTRAMUSCULAR; INTRAVENOUS at 21:19

## 2022-04-25 RX ADMIN — ACETAMINOPHEN 650 MG: 325 TABLET ORAL at 09:08

## 2022-04-25 RX ADMIN — GUAIFENESIN 1200 MG: 600 TABLET, EXTENDED RELEASE ORAL at 09:08

## 2022-04-25 RX ADMIN — SODIUM CHLORIDE, PRESERVATIVE FREE 10 ML: 5 INJECTION INTRAVENOUS at 09:11

## 2022-04-25 RX ADMIN — GUAIFENESIN 1200 MG: 600 TABLET, EXTENDED RELEASE ORAL at 21:19

## 2022-04-25 RX ADMIN — BENZONATATE 200 MG: 100 CAPSULE ORAL at 05:18

## 2022-04-25 RX ADMIN — SODIUM CHLORIDE, PRESERVATIVE FREE 10 ML: 5 INJECTION INTRAVENOUS at 21:21

## 2022-04-25 RX ADMIN — SODIUM CHLORIDE, PRESERVATIVE FREE 10 ML: 5 INJECTION INTRAVENOUS at 17:23

## 2022-04-25 RX ADMIN — BUDESONIDE 500 MCG: 0.5 INHALANT RESPIRATORY (INHALATION) at 20:49

## 2022-04-25 RX ADMIN — METHYLPREDNISOLONE SODIUM SUCCINATE 60 MG: 40 INJECTION, POWDER, FOR SOLUTION INTRAMUSCULAR; INTRAVENOUS at 05:16

## 2022-04-25 RX ADMIN — IPRATROPIUM BROMIDE AND ALBUTEROL SULFATE 3 ML: .5; 3 SOLUTION RESPIRATORY (INHALATION) at 13:50

## 2022-04-25 RX ADMIN — IPRATROPIUM BROMIDE AND ALBUTEROL SULFATE 3 ML: .5; 3 SOLUTION RESPIRATORY (INHALATION) at 20:49

## 2022-04-25 NOTE — PROGRESS NOTES
[facility] Adult  Hospitalist Group                                                                                          Hospitalist Progress Note  [address] [facility],   Answering service: [phone] from in house phone        Date of Service:  2022  NAME:  Cathy Talley  :  1945  MRN:  [id number]      Admission Summary:   \"76 y.o. female who presents with SOB. 65-year-old -American female active smoking, history of COPD arrives via wheelchair c/o (saw PCP Wed) c/o headache, cough, and shortness breath. Patient said during the Easter weekend, met her family member one of her sisters had a flu, and after that then she also developed a chills, dry cough, she went to her PCPs office on Wednesday, tested neg COVID, FLU +. She was giving Tamiflu . Denied any fever, but diffuse increasing coughing, and today feels increasing shortness of breath SOB. Pt endorses smoking less than pack/week. Her last smoking was 2 days ago . pt 91% in triage on ra. Plt placed on 2L, now 95%. Pt AOx4.\"       Interval history / Subjective: Follow up COPD exacerbation. Patient seen and examined. Feels better. Still short of breath with moving. Assessment & Plan:     COPD exacerbation:   Influenza:   -decrease methylpred 40 mg BID  -CT with subsegmental right lung airspace disease, continue emipiric azithromycin  -continue aa nebs q6 hours, brovana/pulmicort BID  -guaifenesin BID, tessalon   -ambulation as able  -continue tamiflu    Hypokalemia: repleted              Code status: full   Prophylaxis: lovenox  Care Plan discussed with: patient  Anticipated Disposition: 1-2 days     Hospital Problems  Date Reviewed: 2016          Codes Class Noted POA    COPD exacerbation ([facility] ICD-10-CM: J44.1  ICD-9-CM: 491.21  2022 Unknown                Review of Systems:   Negative unless stated above      Vital Signs:    Last 24hrs VS reviewed since prior progress note.  Most recent are:  Visit Vitals  /82 (BP 1 Location: Left upper arm, BP Patient Position: At rest)   Pulse 96   Temp 98 °F (36.7 °C)   Resp 16   Ht 5' 6\" (1.676 m)   Wt 59 kg (130 lb)   SpO2 94%   BMI 20.98 kg/m²       No intake or output data in the 24 hours ending 04/25/22 1053     Physical Examination:     I had a face to face encounter with this patient and independently examined them on 4/25/2022 as outlined below:          Constitutional:  No acute distress, cooperative, pleasant    ENT:  Oral mucosa moist, oropharynx benign. Resp:  Diminished bilaterally, no wheeze. No accessory muscle use. CV:  Regular rhythm, normal rate, no murmurs, gallops, rubs    GI:  Soft, non distended, non tender. normoactive bowel sounds, no hepatosplenomegaly     Musculoskeletal:  No edema, warm, 2+ pulses throughout    Neurologic:  Moves all extremities            Data Review:    Review and/or order of clinical lab test  Review and/or order of tests in the radiology section of CPT  Review and/or order of tests in the medicine section of CPT      Labs:     Recent Labs     04/25/22  0149 04/24/22  1228   WBC 3.2* 4.9   HGB 12.4 13.2   HCT 38.0 39.9    205     Recent Labs     04/25/22  0149 04/24/22  1228    137   K 3.6 2.9*    99   CO2 28 31   BUN 12 8   CREA 0.77 0.89   * 119*   CA 9.3 9.5   MG 2.0  --    PHOS 2.0*  --      Recent Labs     04/25/22  0149 04/24/22  1228   ALT 47 52   * 149*   TBILI 0.4 0.4   TP 7.2 7.6   ALB 3.2* 3.7   GLOB 4.0 3.9     No results for input(s): INR, PTP, APTT, INREXT in the last 72 hours. No results for input(s): FE, TIBC, PSAT, FERR in the last 72 hours. No results found for: FOL, RBCF   No results for input(s): PH, PCO2, PO2 in the last 72 hours. No results for input(s): CPK, CKNDX, TROIQ in the last 72 hours.     No lab exists for component: CPKMB  No results found for: CHOL, CHOLX, CHLST, CHOLV, HDL, HDLP, LDL, LDLC, DLDLP, TGLX, TRIGL, TRIGP, CHHD, CHHDX  Lab Results   Component Value Date/Time    Glucose (POC) 86 06/23/2013 11:38 AM     No results found for: COLOR, APPRN, SPGRU, REFSG, SANA, PROTU, GLUCU, KETU, BILU, UROU, AFSHAN, LEUKU, GLUKE, EPSU, BACTU, WBCU, RBCU, CASTS, UCRY      Medications Reviewed:     Current Facility-Administered Medications   Medication Dose Route Frequency    oseltamivir (TAMIFLU) capsule 30 mg  30 mg Oral BID    methylPREDNISolone (PF) (SOLU-MEDROL) injection 40 mg  40 mg IntraVENous Q12H    sodium chloride (NS) flush 5-40 mL  5-40 mL IntraVENous Q8H    sodium chloride (NS) flush 5-40 mL  5-40 mL IntraVENous PRN    acetaminophen (TYLENOL) tablet 650 mg  650 mg Oral Q6H PRN    Or    acetaminophen (TYLENOL) suppository 650 mg  650 mg Rectal Q6H PRN    polyethylene glycol (MIRALAX) packet 17 g  17 g Oral DAILY PRN    ondansetron (ZOFRAN ODT) tablet 4 mg  4 mg Oral Q8H PRN    Or    ondansetron (ZOFRAN) injection 4 mg  4 mg IntraVENous Q6H PRN    enoxaparin (LOVENOX) injection 40 mg  40 mg SubCUTAneous DAILY    guaiFENesin ER (MUCINEX) tablet 1,200 mg  1,200 mg Oral Q12H    benzonatate (TESSALON) capsule 200 mg  200 mg Oral Q8H    azithromycin (ZITHROMAX) 500 mg in  mL  500 mg IntraVENous Q24H    albuterol-ipratropium (DUO-NEB) 2.5 MG-0.5 MG/3 ML  3 mL Nebulization Q6H RT    arformoteroL (BROVANA) neb solution 15 mcg  15 mcg Nebulization BID RT    And    budesonide (PULMICORT) 500 mcg/2 ml nebulizer suspension  500 mcg Nebulization BID RT    hydrALAZINE (APRESOLINE) tablet 10 mg  10 mg Oral Q6H PRN     ______________________________________________________________________  EXPECTED LENGTH OF STAY: 2d 9h  ACTUAL LENGTH OF STAY:          1                 Malgorzata Mederos DO

## 2022-04-25 NOTE — PROGRESS NOTES
Transition of Care  1. RUR 8%low   2. Disposition     Home   3. Transportation  Heather Oden   4. Follow Up PCP/Specialist     Reason for Admission:  COPD                     RUR Score:     8% low                 Plan for utilizing home health:      None at this time     PCP: First and Last name:  Campbell Severe, MD     Name of Practice:    Are you a current patient: Yes/No: yes    Approximate date of last visit: April 2022    Can you participate in a virtual visit with your PCP: n/a                     Current Advanced Directive/Advance Care Plan: Full Code      Healthcare Decision Maker:  Heather Oden [id number]/CUHGWD Gennaro Salinas [phone]   Click here to [address] including selection of the Healthcare Decision Maker Relationship (ie \"Primary\")                             Transition of Care Plan:                      CM spoke with patient/heather Oden at bedside to introduce self and explain role. Verified demographics, emergency contact, insurance, PCP. Living situation:  Home with [facility]/ approx. 11 steps to enter home/ approx. 13 up to bedroom of tri level home. ADL's: Independent   DME:  No medical equp used   Pharmacy: [address]  Discharge transportation: heather Oden [phone]   PT/OT/Rehab/HH history: no prev or recent rehab history     Care Management Interventions  PCP Verified by CM: Yes  Mode of Transport at Discharge:  (daughter -Efrain Oden )  Transition of Care Consult (CM Consult):  (home )  Physical Therapy Consult: No  Occupational Therapy Consult: No  Speech Therapy Consult: No  Support Systems: Child(ani),Other Family Member(s)  Confirm Follow Up Transport: Family  Discharge Location  Patient Expects to be Discharged to[de-identified] Home      Medicare pt has received, reviewed, and signed 1st IM letter informing them of their right to appeal the discharge. Signed copy has been placed on pt bedside chart.       CM to monitor for any further needs.       Valdez Bring RN BSN BSHA

## 2022-04-25 NOTE — PROGRESS NOTES
Clinical Pharmacy Note: Renal Dosing    Medication: Oseltamivir  Indication:  influenza    Recent Labs     22  0149 22  1228   WBC 3.2* 4.9   CREA 0.77 0.89   BUN 12 8     Temp (24hrs), Av.6 °F (37 °C), Min:98 °F (36.7 °C), Max:98.9 °F (37.2 °C)    Estimated Creatinine Clearance: 57 mL/min (based on SCr of 0.77 mg/dL). Plan: Change to 30 mg twice a day per [address] [place] [address] P&T approved dosing protocol.

## 2022-04-26 PROCEDURE — 74011250637 HC RX REV CODE- 250/637: Performed by: INTERNAL MEDICINE

## 2022-04-26 PROCEDURE — 94760 N-INVAS EAR/PLS OXIMETRY 1: CPT

## 2022-04-26 PROCEDURE — 74011250636 HC RX REV CODE- 250/636: Performed by: HOSPITALIST

## 2022-04-26 PROCEDURE — 94664 DEMO&/EVAL PT USE INHALER: CPT

## 2022-04-26 PROCEDURE — 74011000250 HC RX REV CODE- 250: Performed by: HOSPITALIST

## 2022-04-26 PROCEDURE — 77010033678 HC OXYGEN DAILY

## 2022-04-26 PROCEDURE — 74011250637 HC RX REV CODE- 250/637: Performed by: HOSPITALIST

## 2022-04-26 PROCEDURE — 74011250636 HC RX REV CODE- 250/636: Performed by: INTERNAL MEDICINE

## 2022-04-26 PROCEDURE — 94640 AIRWAY INHALATION TREATMENT: CPT

## 2022-04-26 PROCEDURE — 65270000029 HC RM PRIVATE

## 2022-04-26 RX ORDER — IPRATROPIUM BROMIDE AND ALBUTEROL SULFATE 2.5; .5 MG/3ML; MG/3ML
3 SOLUTION RESPIRATORY (INHALATION)
Status: DISCONTINUED | OUTPATIENT
Start: 2022-04-27 | End: 2022-04-27 | Stop reason: HOSPADM

## 2022-04-26 RX ADMIN — METHYLPREDNISOLONE SODIUM SUCCINATE 40 MG: 40 INJECTION, POWDER, FOR SOLUTION INTRAMUSCULAR; INTRAVENOUS at 08:34

## 2022-04-26 RX ADMIN — IPRATROPIUM BROMIDE AND ALBUTEROL SULFATE 3 ML: .5; 3 SOLUTION RESPIRATORY (INHALATION) at 14:32

## 2022-04-26 RX ADMIN — METHYLPREDNISOLONE SODIUM SUCCINATE 40 MG: 40 INJECTION, POWDER, FOR SOLUTION INTRAMUSCULAR; INTRAVENOUS at 21:24

## 2022-04-26 RX ADMIN — OSELTAMIVIR PHOSPHATE 30 MG: 30 CAPSULE ORAL at 18:23

## 2022-04-26 RX ADMIN — OSELTAMIVIR PHOSPHATE 30 MG: 30 CAPSULE ORAL at 08:36

## 2022-04-26 RX ADMIN — BENZONATATE 200 MG: 100 CAPSULE ORAL at 14:26

## 2022-04-26 RX ADMIN — AZITHROMYCIN DIHYDRATE 500 MG: 500 INJECTION, POWDER, LYOPHILIZED, FOR SOLUTION INTRAVENOUS at 15:46

## 2022-04-26 RX ADMIN — SODIUM CHLORIDE, PRESERVATIVE FREE 10 ML: 5 INJECTION INTRAVENOUS at 21:25

## 2022-04-26 RX ADMIN — ARFORMOTEROL TARTRATE 15 MCG: 15 SOLUTION RESPIRATORY (INHALATION) at 08:31

## 2022-04-26 RX ADMIN — GUAIFENESIN 1200 MG: 600 TABLET, EXTENDED RELEASE ORAL at 08:34

## 2022-04-26 RX ADMIN — BENZONATATE 200 MG: 100 CAPSULE ORAL at 21:25

## 2022-04-26 RX ADMIN — SODIUM CHLORIDE, PRESERVATIVE FREE 10 ML: 5 INJECTION INTRAVENOUS at 14:26

## 2022-04-26 RX ADMIN — BUDESONIDE 500 MCG: 0.5 INHALANT RESPIRATORY (INHALATION) at 20:57

## 2022-04-26 RX ADMIN — BUDESONIDE 500 MCG: 0.5 INHALANT RESPIRATORY (INHALATION) at 08:31

## 2022-04-26 RX ADMIN — ARFORMOTEROL TARTRATE 15 MCG: 15 SOLUTION RESPIRATORY (INHALATION) at 20:57

## 2022-04-26 RX ADMIN — SODIUM CHLORIDE, PRESERVATIVE FREE 10 ML: 5 INJECTION INTRAVENOUS at 05:45

## 2022-04-26 RX ADMIN — ENOXAPARIN SODIUM 40 MG: 100 INJECTION SUBCUTANEOUS at 08:34

## 2022-04-26 RX ADMIN — GUAIFENESIN 1200 MG: 600 TABLET, EXTENDED RELEASE ORAL at 21:25

## 2022-04-26 RX ADMIN — BENZONATATE 200 MG: 100 CAPSULE ORAL at 05:45

## 2022-04-26 RX ADMIN — IPRATROPIUM BROMIDE AND ALBUTEROL SULFATE 3 ML: .5; 3 SOLUTION RESPIRATORY (INHALATION) at 20:57

## 2022-04-26 RX ADMIN — IPRATROPIUM BROMIDE AND ALBUTEROL SULFATE 3 ML: .5; 3 SOLUTION RESPIRATORY (INHALATION) at 08:31

## 2022-04-26 NOTE — PROGRESS NOTES
Physician Progress Note      PATIENT:               Guille Lewis  [facility] #:                  [id number]  :                       1945  ADMIT DATE:       2022 12:11 PM  DISCH DATE:  RESPONDING  PROVIDER #:        JOHNNIE Soliz DO          QUERY TEXT:    Dear attending,    Pt admitted with flu. Pt noted to have ABG on  with  pO2 67 on oxygen with SOB . If possible, please document in the progress notes and discharge summary if you are evaluating and/or treating any of the following: The medical record reflects the following:  Risk Factors: Hx. COPD, smoker  Clinical Indicators: - 91% on RA, 90% on O2 1lnc ,  -abg on O2 NC-7.51/36.5/.67/29.1/94/5.9 ,  CTA chest- Subsegmental right lung airspace disease may represent pneumonia or atelectasis. Mild emphysema. Per H&P- Denied any fever, but diffuse increasing coughing, and today feels increasing shortness of breath SOB  COPD exacerbation: due to flu, will continue tamiflu, will repeat COVID 19 due to the pandemic. Treatment: Monitor vital signs, labwork, imaging, pulse oximetry, Oxygen      Thank you,  Nargis Nweman RN, BSN  Clinical documentation Improvement  [phone]  Options provided:  -- Acute respiratory failure with hypoxia  -- hypoxia  -- Other - I will add my own diagnosis  -- Disagree - Not applicable / Not valid  -- Disagree - Clinically unable to determine / Unknown  -- Refer to Clinical Documentation Reviewer    PROVIDER RESPONSE TEXT:    This patient is in acute respiratory failure with hypoxia. Query created by: Jan Stahl on 2022 10:33 AM      QUERY TEXT:    Dear attending,    Pt admitted with flu. Pt noted to have possible pneumonia on CTA chest,and is being treated with IVABX. The patient has elevated neutrophils and  elevated CRP level. If possible, please document in the progress notes and discharge summary if you are evaluating and/or treating any of the following:       The medical record reflects the following:  Risk Factors: Hx. COPD, smoker  Clinical Indicators: 4/22- 91% on RA, 90% on O2 1lnc , neutrophils 80 4/25-WBC 3.2, neutrophils 80  CTA chest- Subsegmental right lung airspace disease may represent pneumonia or atelectasis. Mild emphysema. Per H&P- Denied any fever, but diffuse increasing coughing, and today feels increasing shortness of breath SOB  COPD exacerbation: due to flu, will continue tamiflu, will repeat COVID 19 due to the pandemic. Pt is fully vaccinated and also has booster. Start azithromycin, iv solumedrol. Albuterol. Mucinex. Treatment: Monitor vital signs, labwork, imaging, pulse oximetry, Oxygen, IV Zithromax 500 mg q24 hours, Tamiflu      Thank you,  Nica Sosa RN, BSN  Clinical documentation Improvement  [phone]  Options provided:  -- Bacterial pneumonia  -- Viral pneumonia  -- No pneumonia  -- Other - I will add my own diagnosis  -- Disagree - Not applicable / Not valid  -- Disagree - Clinically unable to determine / Unknown  -- Refer to Clinical Documentation Reviewer    PROVIDER RESPONSE TEXT:    This patient has viral pneumonia.     Query created by: Faisal Santos on 4/25/2022 10:39 AM      Electronically signed by:  Peter Graham DO 4/26/2022 9:35 AM

## 2022-04-26 NOTE — PROGRESS NOTES
Problem: Airway Clearance - Ineffective  Goal: *Patent airway  Outcome: Progressing Towards Goal  Goal: *Absence of airway secretions  Outcome: Progressing Towards Goal  Goal: *Able to cough effectively  Outcome: Progressing Towards Goal     Problem: Breathing Pattern - Ineffective  Goal: *Absence of hypoxia  Outcome: Progressing Towards Goal  Goal: *Use of effective breathing techniques  Outcome: Progressing Towards Goal     Problem: Discharge Planning  Goal: *Discharge to safe environment  Outcome: Progressing Towards Goal Bloody stools Melanotic stools Anemia, unspecified type MRSA cellulitis Open wound of lower extremity, right, initial encounter Essential hypertension

## 2022-04-26 NOTE — PROGRESS NOTES
Problem: Airway Clearance - Ineffective  Goal: *Patent airway  Outcome: Progressing Towards Goal     Problem: Breathing Pattern - Ineffective  Goal: *Absence of hypoxia  Outcome: Progressing Towards Goal     Problem: Discharge Planning  Goal: *Discharge to safe environment  Outcome: Progressing Towards Goal

## 2022-04-26 NOTE — PROGRESS NOTES
Problem: Airway Clearance - Ineffective  Goal: *Patent airway  Outcome: Progressing Towards Goal     Problem: Airway Clearance - Ineffective  Goal: *Absence of airway secretions  Outcome: Progressing Towards Goal     Problem: Airway Clearance - Ineffective  Goal: *Able to cough effectively  Outcome: Progressing Towards Goal

## 2022-04-26 NOTE — PROGRESS NOTES
[facility]                                                                                          Hospitalist Progress Note  Eusebio Shown, DO  Answering service: [phone] from in house phone        Date of Service:  2022  NAME:  Anna Brown  :  1945  MRN:  [id number]      Admission Summary:   \"76 y.o. female who presents with SOB. 65-year-old -American female active smoking, history of COPD arrives via wheelchair c/o (saw PCP Wed) c/o headache, cough, and shortness breath. Patient said during the Easter weekend, met her family member one of her sisters had a flu, and after that then she also developed a chills, dry cough, she went to her PCPs office on Wednesday, tested neg COVID, FLU +. She was giving Tamiflu . Denied any fever, but diffuse increasing coughing, and today feels increasing shortness of breath SOB. Pt endorses smoking less than pack/week. Her last smoking was 2 days ago . pt 91% in triage on ra. Plt placed on 2L, now 95%. Pt AOx4.\"       Interval history / Subjective: Follow up COPD exacerbation. Patient seen and examined. States she feels short of breath. Same as yesterday. Starting to have cough. She is on 2 liters supplemental oxygen. Plan to have nursing staff walk patient and monitor for desaturations     Assessment & Plan:     COPD exacerbation:   Acute hypoxic respiratory failure  Influenza:   -continue methylpred 40 mg BID  -CT with subsegmental right lung airspace disease, continue emipiric azithromycin  -continue aa nebs q6 hours, brovana/pulmicort BID  -guaifenesin BID, tessalon   -ambulation as able.  Walk test to assess needs for supplemental oxygen   -continue tamiflu    Hypokalemia: repleted              Code status: full   Prophylaxis: lovenox  Care Plan discussed with: patient  Anticipated Disposition: 1-2 days     Hospital Problems  Date Reviewed: 2016          Codes Class Noted POA    COPD exacerbation [facility]) ICD-10-CM: J44.1  ICD-9-CM: 491.21  4/24/2022 Unknown                Review of Systems:   Negative unless stated above      Vital Signs:    Last 24hrs VS reviewed since prior progress note. Most recent are:  Visit Vitals  BP (!) 140/86 (BP 1 Location: Left upper arm, BP Patient Position: At rest)   Pulse 81   Temp 98 °F (36.7 °C)   Resp 18   Ht 5' 6\" (1.676 m)   Wt 59 kg (130 lb)   SpO2 95%   BMI 20.98 kg/m²       No intake or output data in the 24 hours ending 04/26/22 6639     Physical Examination:     I had a face to face encounter with this patient and independently examined them on 4/26/2022 as outlined below:          Constitutional:  No acute distress, cooperative, pleasant    ENT:  Oral mucosa moist, oropharynx benign. Resp:  Diminished bilaterally, no wheeze. Not able to speak in long sentences    CV:  Regular rhythm, normal rate, no murmurs, gallops, rubs    GI:  Soft, non distended, non tender. normoactive bowel sounds, no hepatosplenomegaly     Musculoskeletal:  No edema, warm, 2+ pulses throughout    Neurologic:  Moves all extremities            Data Review:    Review and/or order of clinical lab test  Review and/or order of tests in the radiology section of CPT  Review and/or order of tests in the medicine section of CPT      Labs:     Recent Labs     04/25/22  0149 04/24/22  1228   WBC 3.2* 4.9   HGB 12.4 13.2   HCT 38.0 39.9    205     Recent Labs     04/25/22  0149 04/24/22  1228    137   K 3.6 2.9*    99   CO2 28 31   BUN 12 8   CREA 0.77 0.89   * 119*   CA 9.3 9.5   MG 2.0  --    PHOS 2.0*  --      Recent Labs     04/25/22  0149 04/24/22  1228   ALT 47 52   * 149*   TBILI 0.4 0.4   TP 7.2 7.6   ALB 3.2* 3.7   GLOB 4.0 3.9     No results for input(s): INR, PTP, APTT, INREXT, INREXT in the last 72 hours. No results for input(s): FE, TIBC, PSAT, FERR in the last 72 hours.    No results found for: FOL, RBCF   No results for input(s): PH, PCO2, PO2 in the last 72 hours. No results for input(s): CPK, CKNDX, TROIQ in the last 72 hours.     No lab exists for component: CPKMB  No results found for: CHOL, CHOLX, CHLST, CHOLV, HDL, HDLP, LDL, LDLC, DLDLP, TGLX, TRIGL, TRIGP, CHHD, CHHDX  Lab Results   Component Value Date/Time    Glucose (POC) 86 06/23/2013 11:38 AM     No results found for: COLOR, APPRN, SPGRU, REFSG, SANA, PROTU, GLUCU, KETU, BILU, UROU, AFSHAN, LEUKU, GLUKE, EPSU, BACTU, WBCU, RBCU, CASTS, UCRY      Medications Reviewed:     Current Facility-Administered Medications   Medication Dose Route Frequency    oseltamivir (TAMIFLU) capsule 30 mg  30 mg Oral BID    methylPREDNISolone (PF) (SOLU-MEDROL) injection 40 mg  40 mg IntraVENous Q12H    sodium chloride (NS) flush 5-40 mL  5-40 mL IntraVENous Q8H    sodium chloride (NS) flush 5-40 mL  5-40 mL IntraVENous PRN    acetaminophen (TYLENOL) tablet 650 mg  650 mg Oral Q6H PRN    Or    acetaminophen (TYLENOL) suppository 650 mg  650 mg Rectal Q6H PRN    polyethylene glycol (MIRALAX) packet 17 g  17 g Oral DAILY PRN    ondansetron (ZOFRAN ODT) tablet 4 mg  4 mg Oral Q8H PRN    Or    ondansetron (ZOFRAN) injection 4 mg  4 mg IntraVENous Q6H PRN    enoxaparin (LOVENOX) injection 40 mg  40 mg SubCUTAneous DAILY    guaiFENesin ER (MUCINEX) tablet 1,200 mg  1,200 mg Oral Q12H    benzonatate (TESSALON) capsule 200 mg  200 mg Oral Q8H    azithromycin (ZITHROMAX) 500 mg in  mL  500 mg IntraVENous Q24H    albuterol-ipratropium (DUO-NEB) 2.5 MG-0.5 MG/3 ML  3 mL Nebulization Q6H RT    arformoteroL (BROVANA) neb solution 15 mcg  15 mcg Nebulization BID RT    And    budesonide (PULMICORT) 500 mcg/2 ml nebulizer suspension  500 mcg Nebulization BID RT    hydrALAZINE (APRESOLINE) tablet 10 mg  10 mg Oral Q6H PRN     ______________________________________________________________________  EXPECTED LENGTH OF STAY: 2d 9h  ACTUAL LENGTH OF STAY:          2                 Malgorzata Hayes DO

## 2022-04-27 VITALS
HEART RATE: 68 BPM | SYSTOLIC BLOOD PRESSURE: 135 MMHG | BODY MASS INDEX: 20.89 KG/M2 | RESPIRATION RATE: 16 BRPM | WEIGHT: 130 LBS | DIASTOLIC BLOOD PRESSURE: 79 MMHG | TEMPERATURE: 98.5 F | OXYGEN SATURATION: 96 % | HEIGHT: 66 IN

## 2022-04-27 PROCEDURE — 94640 AIRWAY INHALATION TREATMENT: CPT

## 2022-04-27 PROCEDURE — 94618 PULMONARY STRESS TESTING: CPT

## 2022-04-27 PROCEDURE — 74011250637 HC RX REV CODE- 250/637: Performed by: INTERNAL MEDICINE

## 2022-04-27 PROCEDURE — 74011250637 HC RX REV CODE- 250/637: Performed by: HOSPITALIST

## 2022-04-27 PROCEDURE — 74011000250 HC RX REV CODE- 250: Performed by: INTERNAL MEDICINE

## 2022-04-27 PROCEDURE — 74011000250 HC RX REV CODE- 250: Performed by: HOSPITALIST

## 2022-04-27 PROCEDURE — 74011250636 HC RX REV CODE- 250/636: Performed by: HOSPITALIST

## 2022-04-27 PROCEDURE — 74011250636 HC RX REV CODE- 250/636: Performed by: INTERNAL MEDICINE

## 2022-04-27 RX ORDER — AZITHROMYCIN 500 MG/1
500 TABLET, FILM COATED ORAL DAILY
Qty: 2 TABLET | Refills: 0 | Status: SHIPPED | OUTPATIENT
Start: 2022-04-27 | End: 2022-07-15

## 2022-04-27 RX ORDER — PREDNISONE 20 MG/1
TABLET ORAL
Qty: 9 TABLET | Refills: 0 | Status: SHIPPED | OUTPATIENT
Start: 2022-04-28 | End: 2022-07-15

## 2022-04-27 RX ORDER — AZITHROMYCIN 250 MG/1
500 TABLET, FILM COATED ORAL EVERY 24 HOURS
Status: DISCONTINUED | OUTPATIENT
Start: 2022-04-27 | End: 2022-04-27 | Stop reason: HOSPADM

## 2022-04-27 RX ADMIN — SODIUM CHLORIDE, PRESERVATIVE FREE 10 ML: 5 INJECTION INTRAVENOUS at 06:14

## 2022-04-27 RX ADMIN — BUDESONIDE 500 MCG: 0.5 INHALANT RESPIRATORY (INHALATION) at 08:34

## 2022-04-27 RX ADMIN — ENOXAPARIN SODIUM 40 MG: 100 INJECTION SUBCUTANEOUS at 09:01

## 2022-04-27 RX ADMIN — AZITHROMYCIN MONOHYDRATE 500 MG: 250 TABLET ORAL at 14:59

## 2022-04-27 RX ADMIN — BENZONATATE 200 MG: 100 CAPSULE ORAL at 06:12

## 2022-04-27 RX ADMIN — OSELTAMIVIR PHOSPHATE 30 MG: 30 CAPSULE ORAL at 09:01

## 2022-04-27 RX ADMIN — BENZONATATE 200 MG: 100 CAPSULE ORAL at 14:59

## 2022-04-27 RX ADMIN — ARFORMOTEROL TARTRATE 15 MCG: 15 SOLUTION RESPIRATORY (INHALATION) at 08:34

## 2022-04-27 RX ADMIN — IPRATROPIUM BROMIDE AND ALBUTEROL SULFATE 3 ML: .5; 3 SOLUTION RESPIRATORY (INHALATION) at 08:34

## 2022-04-27 RX ADMIN — METHYLPREDNISOLONE SODIUM SUCCINATE 40 MG: 40 INJECTION, POWDER, FOR SOLUTION INTRAMUSCULAR; INTRAVENOUS at 09:01

## 2022-04-27 RX ADMIN — GUAIFENESIN 1200 MG: 600 TABLET, EXTENDED RELEASE ORAL at 09:01

## 2022-04-27 NOTE — PROGRESS NOTES
I have reviewed discharge instructions with the patient. Prescriptions faxed to patient's pharmacy. Questions and concerns addressed. The patient verbalized understanding. IV access removed. Patient's signature on chart for discharge paperwork d/t signature pad not working. 1524: Patient discharged via private vehicle.

## 2022-04-27 NOTE — PROGRESS NOTES
Clinical Pharmacy Note: Re: IV to PO Automatic Conversion - Antibiotic    Please note: Ritika Elizondo medication Azithromycin 500 mg IV daily has been changed from IV to PO based on the following criteria:    The patient:  Received IV therapy for at least 24 hours   Is tolerating diet more advanced than clear liquids  Is tolerating oral medications  Is not on vasopressor blood pressure support (i.e. no signs of shock)      This IV to PO conversion is based on the P&[employer] approved automatic conversion policy for eligible patients. Please call with questions.

## 2022-04-27 NOTE — PROGRESS NOTES
04/27/22 1220   Resting (Room Air)   SpO2 95   HR 76   During Walk (Room Air)   SpO2 94   HR 86   Rate of Dyspnea 2   Symptoms Shortness of breath   Comments   (She walked only 3 mins because of SOB.)   After Walk   SpO2 94   HR 83   O2 Device   (ROOM AIR)   Rate of Dyspnea 1   Symptoms Shortness of breath

## 2022-04-27 NOTE — PROGRESS NOTES
Music Therapy Assessment  [address] [place] [id number]     1945  67 y.o.  female    Patient Telephone Number: [phone] (home)   Evangelical Affiliation: Protestant   Language: English   Patient Active Problem List    Diagnosis Date Noted    COPD exacerbation ([address] 04/24/2022        Date: 4/27/2022            Total Time (in minutes): 36          [facility] 6E MED ONCOLOGY    Mental Status:   [x] Alert [  ] Tor Blinks [  ]  Confused  [  ] Minimally responsive  [  ] Sleeping    Communication Status: [  ] Impaired Speech [  ] Nonverbal -N/A    Physical Status:   [x] Oxygen in use  [  ] Hard of Hearing [  ] Vision Impaired  [  ] Ambulatory  [  ] Ambulatory with assistance [  ] Non-ambulatory     Music Preferences, Background: Traditional and [employer]; 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 Clinical Problem addressed: Spiritual support, positive social interaction. Goal(s) met in session:  Physical/Pain management (Scale of 1-10):    Pre-session rating: Pt denied pain. Post-session rating: Pt didn't report on pain.   [  ] Increased relaxation   [  ] Affected breathing patterns  [  ] Decreased muscle tension   [  ] Decreased agitation  [  ] Affected heart rate    [  ] Increased alertness     Emotional/Psychological:  [x] Increased self-expression   [  ] Decreased aggressive behavior   [  ] Decreased feelings of stress  [  ] Discussed healthy coping skills     [  ] Improved mood    [  ] Decreased withdrawn behavior     Social:  [  ] Decreased feelings of isolation/loneliness [x] Positive social interaction   [  ] Provided support and/or comfort for family/friends    Spiritual:  [x] Spiritual support    [  ] Expressed peace  [  ] Expressed robby    [  ] Discussed beliefs    Techniques Utilized (Check all that apply):   [  ] Procedural support MT [  ] Music for relaxation [x] Patient preferred music  [  ] Alyssa analysis  [x] Song choice  [  ] Music for validation  [  ] Entrainment  [  ] Movement to music [  ] Guided visualization  [  ] Tu Gandara  [  ] Patient instrument playing [  ] Mauricio Marking writing  [  ] Leanna Moreno along   [  ] Jolene Graham  [  ] Sensory stimulation  [x] Active Listening  [x] Music for spiritual support [  ] Making of CDs as gifts    Session Observations:  Referral from Father Fatemeh Tatum. Patient (pt) was alert sitting in a chair. She was dressed in her clothes and she shared she was going to be discharged later today. This music therapist (MT) asked pt how she was feeling and pt reported on this. MT asked pt about her music preferences and music background and pt shared about these. Pt named Zurdo Millan as a Gospel artist she enjoys and that the choir sings in her [employer]. MT sang and played with guitar his song [place] the [place]. Pt expressed enjoyment in the music and shared she'd just sung with her choir the Sunday before last, but hasn't been able to sing lately due to breathing issues. MT provided active listening and then asked pt about her interests. Pt increased self-expression, as evidenced by (AEB) sharing she enjoys being outside, gardening, mowing the lawn and staying busy. MT sang and played Carol Lord, Take My Hand, and pt sang along with the first verse. She shared about her children and grandchildren afterward, saying they all get along and enjoy and love each other. Pt chose to hear What a Friend We Have in [facility]. MT sang and played this with guitar. Pt expressed enjoyment in the music and gratitude for the session.     JAGDISH RecioBC (Music Therapist-Board Certified)  Spiritual Care Department  Referral-based service

## 2022-04-27 NOTE — PROGRESS NOTES
I prayed with Teetee Montoya and celebrated with her the [address]. I also gave her Communion.   Father Christina Littlejohn

## 2022-04-27 NOTE — DISCHARGE SUMMARY
Discharge Summary       PATIENT ID: Tiffanie Cox  MRN: [id number]   YOB: 1945    DATE OF ADMISSION: 4/24/2022 12:11 PM    DATE OF DISCHARGE: 4/27/2022   PRIMARY CARE PROVIDER: Anders Rodriguez MD     ATTENDING PHYSICIAN: [facility]   DISCHARGING PROVIDER: [address] [place], [place] [address]DO    To contact this individual call [phone] and ask the  to page. If unavailable ask to be transferred the Adult Hospitalist Department. CONSULTATIONS: IP CONSULT TO HOSPITALIST    PROCEDURES/SURGERIES: * No surgery found *    ADMISSION SUMMARY AND HOSPITAL COURSE:   \"\"76 y. o. female who presents with SOB. 48-year-old -American female active smoking, history of COPD arrives via wheelchair c/o (saw PCP Wed) c/o headache, cough, and shortness breath.  Patient said during the Easter weekend, met her family member one of her sisters had a flu, and after that then she also developed a chills, dry cough, she went to her PCPs office on [address] [place] [address], FLU +. Daily [facility] was giving Tamiflu .  Denied any fever, but diffuse increasing coughing, and today feels increasing shortness of breath SOB. Pt endorses smoking less than pack/week.  Her last smoking was 2 days ago . pt 91% in triage on ra. Plt placed on 2L, now 95%. Pt AOx4. \"       DISCHARGE DIAGNOSES / PLAN:      COPD exacerbation:   Acute hypoxic respiratory failure:  Influenza:   Viral pneumonia:   -continue methylpred 40 mg BID while inpatient. Prednisone taper on discharge  -CT with subsegmental right lung airspace disease, continue emipiric azithromycin x 5 days  -continue aa nebs q6 hours, brovana/pulmicort BID. Home albuterol inhaler as needed and home Advair as scheduled on discharge   -Walked halls with respiratory therapy. No desaturation or tachycardia. Did have shortness of breath after walking 3 minutes.   -tamiflu as previously prescribed    Outpatient follow up      Hypokalemia: repleted    BMI: Body mass index is 20.98 kg/m². Adina Michel This patient: Has a BMI within normal limits. PENDING TEST RESULTS:   At the time of discharge the following test results are still pending: none     ADDITIONAL CARE RECOMMENDATIONS:   New medications:   -Prednisone: Take 40 mg (2 tablets) once daily x 3 days, 20 mg (1 tablet) once daily for 3 days. Start 4/28/2022  -Azithromycin: Take 500 mg for two days, starting today, 4/27/2022    Continue Advair inhaler as prescribed. Use albuterol inhaler as needed. NOTIFY YOUR PHYSICIAN FOR ANY OF THE FOLLOWING:   Fever over 101 degrees for 24 hours. Chest pain, shortness of breath, fever, chills, nausea, vomiting, diarrhea, change in mentation, falling, weakness, bleeding. Severe pain or pain not relieved by medications, as well as any other signs or symptoms that you may have questions about. FOLLOW UP APPOINTMENTS:    Follow-up Information     Follow up With Specialties Details Why Contact Info    Jeferson Eduardo MD [facility]   [address] [facility]  [phone]             DISCHARGE MEDICATIONS:  Current Discharge Medication List      START taking these medications    Details   predniSONE (DELTASONE) 20 mg tablet Take 40 mg (2 tablets) for 3 days, then 20 mg (1 tablet) for 3 days  Qty: 9 Tablet, Refills: 0  Start date: 4/28/2022      azithromycin (ZITHROMAX) 500 mg tab Take 1 Tablet by mouth daily. Qty: 2 Tablet, Refills: 0  Start date: 4/27/2022         CONTINUE these medications which have NOT CHANGED    Details   albuterol (VENTOLIN HFA) 90 mcg/actuation inhaler Take 2 Puffs by inhalation every four (4) hours as needed for Wheezing.              DISPOSITION:  x  Home With:   OT  PT  HH  RN       Long term SNF/Inpatient Rehab    Independent/assisted living    Hospice    Other:       PATIENT CONDITION AT DISCHARGE:     Functional status    Poor     Deconditioned    x Independent      Cognition   x  Lucid     Forgetful     Dementia      Catheters/lines (plus indication)    Kady PICC     PEG    x None      Code status    xx Full code     DNR      PHYSICAL EXAMINATION AT DISCHARGE:  Constitutional:  No acute distress, cooperative, pleasant    ENT:  Oral mucosa moist, oropharynx benign. Resp:  Diminished bilaterally, no wheeze. Able to speak in long sentencesx   CV:  Regular rhythm, normal rate, no murmurs, gallops, rubs    GI:  Soft, non distended, non tender.  normoactive bowel sounds, no hepatosplenomegaly     Musculoskeletal:  No edema, warm, 2+ pulses throughout    Neurologic:  Moves all extremities                                 CHRONIC MEDICAL DIAGNOSES:  Problem List as of 4/27/2022 Date Reviewed: 9/20/2016          Codes Class Noted - Resolved    COPD exacerbation ([facility] [address] ICD-10-CM: J44.1  ICD-9-CM: 491.21  4/24/2022 - Present              Greater than 30 minutes were spent with the patient on counseling and coordination of care    Signed:   Fer Avendano DO  4/27/2022  12:59 PM

## 2022-04-27 NOTE — DISCHARGE INSTRUCTIONS
Discharge Instructions       PATIENT ID: Crystal Reich  MRN: [id number]   YOB: 1945    DATE OF ADMISSION: 4/24/2022 12:11 PM    DATE OF DISCHARGE: 4/27/2022    PRIMARY CARE PROVIDER: Sarahy Trujillo MD     ATTENDING PHYSICIAN: Mychal Angulo DO  DISCHARGING PROVIDER: Nettie Acevedo DO    To contact this individual call [phone] and ask the  to page. If unavailable ask to be transferred the Adult Hospitalist Department. DISCHARGE DIAGNOSES     COPD exacerbation  Acute hypoxic respiratory failure  Influenza     CONSULTATIONS: IP CONSULT TO HOSPITALIST    PROCEDURES/SURGERIES: * No surgery found *    PENDING TEST RESULTS:   At the time of discharge the following test results are still pending: none    FOLLOW UP APPOINTMENTS:   Follow-up Information     Follow up With Specialties Details Why Contact Info    Sarahy Trujillo MD [address]  [phone]             ADDITIONAL CARE RECOMMENDATIONS:    New medications:   -Prednisone: Take 40 mg (2 tablets) once daily x 3 days, 20 mg (1 tablet) once daily for 3 days. Start 4/28/2022  -Azithromycin: Take 500 mg for two days, starting today, 4/27/2022    Continue Advair inhaler as prescribed. Use albuterol inhaler as needed. DISCHARGE MEDICATIONS:   See Medication Reconciliation Form    · It is important that you take the medication exactly as they are prescribed. · Keep your medication in the bottles provided by the pharmacist and keep a list of the medication names, dosages, and times to be taken in your wallet. · Do not take other medications without consulting your doctor. NOTIFY YOUR PHYSICIAN FOR ANY OF THE FOLLOWING:   Fever over 101 degrees for 24 hours. Chest pain, shortness of breath, fever, chills, nausea, vomiting, diarrhea, change in mentation, falling, weakness, bleeding. Severe pain or pain not relieved by medications.   Or, any other signs or symptoms that you may have questions about.         Signed:   Ayad Newell DO  4/27/2022  12:55 PM

## 2022-04-28 NOTE — PROGRESS NOTES
Problem: Airway Clearance - Ineffective  Goal: *Patent airway  Outcome: Resolved/Met  Goal: *Absence of airway secretions  Outcome: Resolved/Met  Goal: *Able to cough effectively  Outcome: Resolved/Met     Problem: Breathing Pattern - Ineffective  Goal: *Absence of hypoxia  Outcome: Resolved/Met  Goal: *Use of effective breathing techniques  Outcome: Resolved/Met     Problem: Discharge Planning  Goal: *Discharge to safe environment  Outcome: Resolved/Met

## 2022-07-15 RX ORDER — ASPIRIN 81 MG/1
81 TABLET ORAL DAILY
COMMUNITY

## 2022-07-15 RX ORDER — ATORVASTATIN CALCIUM 20 MG/1
20 TABLET, FILM COATED ORAL DAILY
COMMUNITY

## 2022-07-18 ENCOUNTER — HOSPITAL ENCOUNTER (OUTPATIENT)
Age: 77
Setting detail: OUTPATIENT SURGERY
Discharge: HOME | End: 2022-07-18
Attending: INTERNAL MEDICINE | Admitting: INTERNAL MEDICINE
Payer: MEDICARE

## 2022-07-18 ENCOUNTER — ANESTHESIA (OUTPATIENT)
Dept: ENDOSCOPY | Age: 77
End: 2022-07-18
Payer: MEDICARE

## 2022-07-18 ENCOUNTER — ANESTHESIA EVENT (OUTPATIENT)
Dept: ENDOSCOPY | Age: 77
End: 2022-07-18
Payer: MEDICARE

## 2022-07-18 VITALS
SYSTOLIC BLOOD PRESSURE: 124 MMHG | BODY MASS INDEX: 18.96 KG/M2 | DIASTOLIC BLOOD PRESSURE: 80 MMHG | RESPIRATION RATE: 17 BRPM | HEART RATE: 62 BPM | OXYGEN SATURATION: 100 % | TEMPERATURE: 97.7 F | HEIGHT: 66 IN | WEIGHT: 118 LBS

## 2022-07-18 PROCEDURE — 74011250636 HC RX REV CODE- 250/636: Performed by: INTERNAL MEDICINE

## 2022-07-18 PROCEDURE — 76060000031 HC ANESTHESIA FIRST 0.5 HR: Performed by: INTERNAL MEDICINE

## 2022-07-18 PROCEDURE — 76040000019: Performed by: INTERNAL MEDICINE

## 2022-07-18 PROCEDURE — 74011000250 HC RX REV CODE- 250: Performed by: NURSE ANESTHETIST, CERTIFIED REGISTERED

## 2022-07-18 PROCEDURE — 74011250636 HC RX REV CODE- 250/636: Performed by: NURSE ANESTHETIST, CERTIFIED REGISTERED

## 2022-07-18 PROCEDURE — 2709999900 HC NON-CHARGEABLE SUPPLY: Performed by: INTERNAL MEDICINE

## 2022-07-18 RX ORDER — ATROPINE SULFATE 0.1 MG/ML
0.5 INJECTION INTRAVENOUS
Status: DISCONTINUED | OUTPATIENT
Start: 2022-07-18 | End: 2022-07-18 | Stop reason: HOSPADM

## 2022-07-18 RX ORDER — FLUMAZENIL 0.1 MG/ML
0.2 INJECTION INTRAVENOUS
Status: DISCONTINUED | OUTPATIENT
Start: 2022-07-18 | End: 2022-07-18 | Stop reason: HOSPADM

## 2022-07-18 RX ORDER — SODIUM CHLORIDE 0.9 % (FLUSH) 0.9 %
5-40 SYRINGE (ML) INJECTION EVERY 8 HOURS
Status: DISCONTINUED | OUTPATIENT
Start: 2022-07-18 | End: 2022-07-18 | Stop reason: HOSPADM

## 2022-07-18 RX ORDER — MIDAZOLAM HYDROCHLORIDE 1 MG/ML
.25-5 INJECTION, SOLUTION INTRAMUSCULAR; INTRAVENOUS
Status: DISCONTINUED | OUTPATIENT
Start: 2022-07-18 | End: 2022-07-18 | Stop reason: HOSPADM

## 2022-07-18 RX ORDER — SODIUM CHLORIDE 9 MG/ML
100 INJECTION, SOLUTION INTRAVENOUS CONTINUOUS
Status: DISCONTINUED | OUTPATIENT
Start: 2022-07-18 | End: 2022-07-18 | Stop reason: HOSPADM

## 2022-07-18 RX ORDER — EPINEPHRINE 0.1 MG/ML
1 INJECTION INTRACARDIAC; INTRAVENOUS
Status: DISCONTINUED | OUTPATIENT
Start: 2022-07-18 | End: 2022-07-18 | Stop reason: HOSPADM

## 2022-07-18 RX ORDER — DEXTROMETHORPHAN/PSEUDOEPHED 2.5-7.5/.8
1.2 DROPS ORAL
Status: DISCONTINUED | OUTPATIENT
Start: 2022-07-18 | End: 2022-07-18 | Stop reason: HOSPADM

## 2022-07-18 RX ORDER — PROPOFOL 10 MG/ML
INJECTION, EMULSION INTRAVENOUS AS NEEDED
Status: DISCONTINUED | OUTPATIENT
Start: 2022-07-18 | End: 2022-07-18 | Stop reason: HOSPADM

## 2022-07-18 RX ORDER — LIDOCAINE HYDROCHLORIDE 20 MG/ML
INJECTION, SOLUTION EPIDURAL; INFILTRATION; INTRACAUDAL; PERINEURAL AS NEEDED
Status: DISCONTINUED | OUTPATIENT
Start: 2022-07-18 | End: 2022-07-18 | Stop reason: HOSPADM

## 2022-07-18 RX ORDER — SODIUM CHLORIDE 0.9 % (FLUSH) 0.9 %
5-40 SYRINGE (ML) INJECTION AS NEEDED
Status: DISCONTINUED | OUTPATIENT
Start: 2022-07-18 | End: 2022-07-18 | Stop reason: HOSPADM

## 2022-07-18 RX ORDER — NALOXONE HYDROCHLORIDE 0.4 MG/ML
0.4 INJECTION, SOLUTION INTRAMUSCULAR; INTRAVENOUS; SUBCUTANEOUS
Status: DISCONTINUED | OUTPATIENT
Start: 2022-07-18 | End: 2022-07-18 | Stop reason: HOSPADM

## 2022-07-18 RX ADMIN — PROPOFOL 100 MG: 10 INJECTION, EMULSION INTRAVENOUS at 10:04

## 2022-07-18 RX ADMIN — LIDOCAINE HYDROCHLORIDE 40 MG: 20 INJECTION, SOLUTION EPIDURAL; INFILTRATION; INTRACAUDAL; PERINEURAL at 10:03

## 2022-07-18 RX ADMIN — SODIUM CHLORIDE 100 ML/HR: 9 INJECTION, SOLUTION INTRAVENOUS at 09:57

## 2022-07-18 RX ADMIN — PROPOFOL 100 MG: 10 INJECTION, EMULSION INTRAVENOUS at 10:09

## 2022-07-18 NOTE — PROCEDURES
[facility]                  Colonoscopy Operative Report    7/18/2022      Maureen Fung  [id number]  1945    Procedure Type:   Colonoscopy --screening     Indications:    Family history of coloretal cancer (screening only), Personal history of colon polyps (screening only)     Pre-operative Diagnosis: see indication above    Post-operative Diagnosis:  See findings below    :  Tala Mckinley MD    Referring Provider: Cely Gambino MD      Sedation:  MAC anesthesia Propofol    Pre-Procedural Exam:      Airway: clear,  No airway problems anticipated  Heart: RRR, without gallops or rubs  Lungs: clear bilaterally without wheezes, crackles, or rhonchi  Abdomen: soft, nontender, nondistended, bowel sounds present  Mental Status: awake, alert and oriented to person, place and time     Procedure Details:  After informed consent was obtained with all risks and benefits of procedure explained and preoperative exam completed, the patient was taken to the endoscopy suite and placed in the left lateral decubitus position. Upon sequential sedation as per above, a digital rectal exam was performed . The Olympus videocolonoscope  was inserted in the rectum and carefully advanced to the cecum, which was identified by the ileocecal valve and appendiceal orifice. The cecum was identified by the ileocecal valve and appendiceal orifice. The quality of preparation was good. The colonoscope was slowly withdrawn with careful evaluation between folds. Retroflexion in the rectum was completed demonstrating internal hemorrhoids. Findings:   Rectum: Grade 1 internal hemorrhoid(s); Sigmoid: normal  Descending Colon: normal  Transverse Colon: normal  Ascending Colon: normal  Cecum: normal  Terminal Ileum: not intubated      Specimen Removed:  none    Complications: None. EBL:  None.     Impression:    normal colonic mucosa throughout  hemorrhoids internal, Moderate in size    Recommendations: --No repeat screening colonoscopy indicated due to age. High fiber diet. Resume normal medication(s). Discharge Disposition:  Home in the company of a  when able to ambulate. Susan Hernandez MD    7/18/2022     ARTEMIO Santos MD  [facility], [address] [place] [address] [address] [place] [address]  [phone]  [email or web address]

## 2022-07-18 NOTE — PERIOP NOTES
Endoscopy Case End Note:    1015:  Procedure scope was pre-cleaned, per protocol, at bedside by Jose Alfredo Sosa. 1017:  Report received from anesthesia - Cas Lowe CRNA. See anesthesia flowsheet for intra-procedure vital signs and events. 1019:  Dentures remained in mouth during procedure.

## 2022-07-18 NOTE — H&P
Osman Mosher MD  [facility], [address], [address]  [address]  [phone]  [email or web address]    Gastroenterology Outpatient History and Physical    Patient: Alena Rollins    Physician: Sunday Landaverde MD    Vital Signs: There were no vitals taken for this visit. Allergies: No Known Allergies    Chief Complaint: Screening colonoscopy    History of Present Illness: Here for a screening colonoscopy. Last colonoscopy was 2016 and showed no polyps. Currently has no GI symptoms. Positive FH of colon cancer. History:  Past Medical History:   Diagnosis Date    Elevated cholesterol       Past Surgical History:   Procedure Laterality Date    COLONOSCOPY N/A 9/20/2016    COLONOSCOPY performed by Sunday Landaverde MD at [address]; HI RISK IND  9/20/2016         HX HYSTERECTOMY      HX OTHER SURGICAL      colonoscopy    AL COLSC FLX W/RMVL OF TUMOR POLYP LESION SNARE TQ  7/20/2011           Social History     Socioeconomic History    Marital status:    Tobacco Use    Smoking status: Current Every Day Smoker     Packs/day: 0.50     Years: 40.00     Pack years: 20.00    Smokeless tobacco: Never Used    Tobacco comment: 4 ciggd/ dsy    Substance and Sexual Activity    Alcohol use: No    Drug use: No      Family History   Problem Relation Age of Onset    Heart Disease Mother     Other Father         AAA    Cancer Brother       Patient Active Problem List   Diagnosis Code    [facility] J44.1       Medications:   Prior to Admission medications    Medication Sig Start Date End Date Taking? Authorizing Provider   aspirin delayed-release 81 mg tablet Take 81 mg by mouth daily. Yes Provider, Historical   atorvastatin (LIPITOR) 20 mg tablet Take 20 mg by mouth daily.    Yes Provider, Historical   albuterol (VENTOLIN HFA) 90 mcg/actuation inhaler Take 2 Puffs by inhalation every four (4) hours as needed for Wheezing.     Provider, Historical       Physical Exam:     General: well developed, well nourished   HEENT: unremarkable   Heart: regular rhythm no mumur    Lungs: clear   Abdominal:  benign   Neurological: unremarkable   Extremities: no edema     Findings/Diagnosis: Screening colonoscopy    Plan of Care/Planned Procedure: Colonoscopy with monitored anesthesia care sedation    Signed:  Sandra Mcnally MD 7/18/2022

## 2022-07-18 NOTE — PROGRESS NOTES
Jacob Washingtonma  1945  [id number]    Situation:  Verbal report received from: Gavin Salamanca RN  Procedure: Procedure(s):  COLONOSCOPY    Background:    Preoperative diagnosis: HX OF POLYPS  Postoperative diagnosis: hemorrhoids    :  Dr. Lazarus Points  Assistant(s): Endoscopy Technician-1: Gracy Woodard  Endoscopy RN-1: Bigg Unger RN    Specimens: * No specimens in log *  H. Pylori  no    Assessment:  Intra-procedure medications     Anesthesia gave intra-procedure sedation and medications, see anesthesia flow sheet yes    Intravenous fluids: NS@ KVO     Vital signs stable yes    Abdominal assessment: round and soft yes    Recommendation:  Discharge patient per MD order yes.   Return to floor n/a  Family or [place], daughter  Permission to share finding with family or friend yes

## 2022-07-18 NOTE — ANESTHESIA POSTPROCEDURE EVALUATION
Procedure(s):  COLONOSCOPY. general, total IV anesthesia    Anesthesia Post Evaluation        Patient location during evaluation: PACU  Note status: Adequate. Level of consciousness: responsive to verbal stimuli and sleepy but conscious  Pain management: satisfactory to patient  Airway patency: patent  Anesthetic complications: no  Cardiovascular status: acceptable  Respiratory status: acceptable  Hydration status: acceptable  Comments: +Post-Anesthesia Evaluation and Assessment    Patient: Aime Jeffrey MRN: [id number]  SSN: [id number]   YOB: 1945  Age: 67 y.o. Sex: female      Cardiovascular Function/Vital Signs    /80   Pulse 62   Temp 36.5 °C (97.7 °F)   Resp 17   Ht 5' 6\" (1.676 m)   Wt 53.5 kg (118 lb)   SpO2 100%   BMI 19.05 kg/m²     Patient is status post Procedure(s):  COLONOSCOPY. Nausea/Vomiting: Controlled. Postoperative hydration reviewed and adequate. Pain:  Pain Scale 1: Numeric (0 - 10) (07/18/22 1042)  Pain Intensity 1: 0 (07/18/22 1042)   Managed. Neurological Status: At baseline. Mental Status and Level of Consciousness: Arousable. Pulmonary Status:   O2 Device: None (Room air) (07/18/22 1042)   Adequate oxygenation and airway patent. Complications related to anesthesia: None    Post-anesthesia assessment completed. No concerns. Signed By: Ashley Pandey MD    7/18/2022  Post anesthesia nausea and vomiting:  controlled      INITIAL Post-op Vital signs:   Vitals Value Taken Time   /94 07/18/22 1045   Temp 36.5 °C (97.7 °F) 07/18/22 1025   Pulse 64 07/18/22 1046   Resp 19 07/18/22 1046   SpO2 97 % 07/18/22 1046   Vitals shown include unvalidated device data.

## 2022-07-18 NOTE — DISCHARGE INSTRUCTIONS
Yuridia Prieto MD  [facility], [address]  [place], [address] [facility]  [phone]  [email or web address]    Omid Mcwilliams  [id number]  1945    COLON DISCHARGE INSTRUCTIONS  Discomfort:  Redness at IV site- apply warm compress to area; if redness or soreness persist- contact your physician  There may be a slight amount of blood passed from the rectum  Gaseous discomfort- walking, belching will help relieve any discomfort  You may not operate a vehicle for 12 hours  You may not engage in an occupation involving machinery or appliances for rest of today  You may not drink alcoholic beverages for at least 12 hours  Avoid making any critical decisions for at least 24 hour  DIET:   High fiber diet. - however -  remember your colon is empty and a heavy meal will produce gas. Avoid these foods:  vegetables, fried / greasy foods, carbonated drinks for today      ACTIVITY:  You may resume your normal daily activities it is recommended that you spend the remainder of the day resting -  avoid any strenuous activity. CALL M.D. ANY SIGN OF:   Increasing pain, nausea, vomiting  Abdominal distension (swelling)  New increased bleeding (oral or rectal)  Fever (chills)  Pain in chest area  Bloody discharge from nose or mouth  Shortness of breath     COLONOSCOPY FINDINGS:  Your colonoscopy showed: no polyps found. Follow-up Instructions:   Call Dr. Yuridia Prieto if any questions or problems. Telephone # [phone]  No repeated colonoscopy needed unless you have some symptoms.

## 2022-07-18 NOTE — ANESTHESIA PREPROCEDURE EVALUATION
Anesthetic History   No history of anesthetic complications            Review of Systems / Medical History  Patient summary reviewed, nursing notes reviewed and pertinent labs reviewed    Pulmonary    COPD      Smoker         Neuro/Psych   Within defined limits           Cardiovascular              Hyperlipidemia    Exercise tolerance: >4 METS     GI/Hepatic/Renal  Within defined limits              Endo/Other  Within defined limits           Other Findings              Physical Exam    Airway  Mallampati: II  TM Distance: 4 - 6 cm  Neck ROM: normal range of motion   Mouth opening: Normal     Cardiovascular  Regular rate and rhythm,  S1 and S2 normal,  no murmur, click, rub, or gallop             Dental  No notable dental hx       Pulmonary  Breath sounds clear to auscultation               Abdominal  GI exam deferred       Other Findings            Anesthetic Plan    ASA: 2  Anesthesia type: general and total IV anesthesia          Induction: Intravenous  Anesthetic plan and risks discussed with: Patient      Propofol MAC

## 2023-02-17 ENCOUNTER — TRANSCRIBE ORDER (OUTPATIENT)
Dept: SCHEDULING | Age: 78
End: 2023-02-17

## 2023-02-17 DIAGNOSIS — Z12.31 VISIT FOR SCREENING MAMMOGRAM: ICD-10-CM

## 2023-02-17 DIAGNOSIS — M81.0 OSTEOPOROSIS: Primary | ICD-10-CM

## 2023-02-23 ENCOUNTER — HOSPITAL ENCOUNTER (OUTPATIENT)
Dept: MAMMOGRAPHY | Age: 78
Discharge: HOME | End: 2023-02-23
Attending: STUDENT IN AN ORGANIZED HEALTH CARE EDUCATION/TRAINING PROGRAM
Payer: MEDICARE

## 2023-02-23 ENCOUNTER — HOSPITAL ENCOUNTER (OUTPATIENT)
Dept: MAMMOGRAPHY | Age: 78
End: 2023-02-23
Attending: STUDENT IN AN ORGANIZED HEALTH CARE EDUCATION/TRAINING PROGRAM
Payer: MEDICARE

## 2023-02-23 DIAGNOSIS — Z12.31 VISIT FOR SCREENING MAMMOGRAM: ICD-10-CM

## 2023-02-23 DIAGNOSIS — M81.0 OSTEOPOROSIS: ICD-10-CM

## 2023-02-23 PROCEDURE — 77080 DXA BONE DENSITY AXIAL: CPT

## 2023-02-23 PROCEDURE — 77063 BREAST TOMOSYNTHESIS BI: CPT

## 2023-05-30 ENCOUNTER — HOSPITAL ENCOUNTER (INPATIENT)
Facility: HOSPITAL | Age: 78
LOS: 3 days | Discharge: HOME | End: 2023-06-02
Attending: EMERGENCY MEDICINE | Admitting: HOSPITALIST
Payer: MEDICARE

## 2023-05-30 ENCOUNTER — APPOINTMENT (OUTPATIENT)
Facility: HOSPITAL | Age: 78
End: 2023-05-30
Payer: MEDICARE

## 2023-05-30 DIAGNOSIS — R09.02 HYPOXEMIA: Primary | ICD-10-CM

## 2023-05-30 DIAGNOSIS — N17.9 ACUTE KIDNEY INJURY (HCC): ICD-10-CM

## 2023-05-30 DIAGNOSIS — J18.9 PNEUMONIA OF RIGHT UPPER LOBE DUE TO INFECTIOUS ORGANISM: ICD-10-CM

## 2023-05-30 PROBLEM — J96.20 ACUTE AND CHR RESP FAILURE, UNSP W HYPOXIA OR HYPERCAPNIA (HCC): Status: ACTIVE | Noted: 2023-05-30

## 2023-05-30 LAB
ALBUMIN SERPL-MCNC: 2.7 G/DL (ref 3.5–5)
ALBUMIN/GLOB SERPL: 0.6 (ref 1.1–2.2)
ALP SERPL-CCNC: 110 U/L (ref 45–117)
ALT SERPL-CCNC: 47 U/L (ref 12–78)
ANION GAP SERPL CALC-SCNC: 8 MMOL/L (ref 5–15)
ARTERIAL PATENCY WRIST A: POSITIVE
AST SERPL-CCNC: 28 U/L (ref 15–37)
B PERT DNA SPEC QL NAA+PROBE: NOT DETECTED
BASE DEFICIT BLD-SCNC: 2.3 MMOL/L
BASOPHILS # BLD: 0 K/UL (ref 0–0.1)
BASOPHILS NFR BLD: 0 % (ref 0–1)
BDY SITE: ABNORMAL
BILIRUB SERPL-MCNC: 0.8 MG/DL (ref 0.2–1)
BORDETELLA PARAPERTUSSIS BY PCR: NOT DETECTED
BUN SERPL-MCNC: 38 MG/DL (ref 6–20)
BUN/CREAT SERPL: 28 (ref 12–20)
C PNEUM DNA SPEC QL NAA+PROBE: NOT DETECTED
CALCIUM SERPL-MCNC: 9.9 MG/DL (ref 8.5–10.1)
CHLORIDE SERPL-SCNC: 103 MMOL/L (ref 97–108)
CO2 SERPL-SCNC: 25 MMOL/L (ref 21–32)
COMMENT:: NORMAL
CREAT SERPL-MCNC: 1.38 MG/DL (ref 0.55–1.02)
DIFFERENTIAL METHOD BLD: ABNORMAL
EKG ATRIAL RATE: 97 BPM
EKG DIAGNOSIS: NORMAL
EKG P AXIS: 72 DEGREES
EKG P-R INTERVAL: 222 MS
EKG Q-T INTERVAL: 356 MS
EKG QRS DURATION: 80 MS
EKG QTC CALCULATION (BAZETT): 452 MS
EKG R AXIS: 86 DEGREES
EKG T AXIS: 83 DEGREES
EKG VENTRICULAR RATE: 97 BPM
EOSINOPHIL # BLD: 0 K/UL (ref 0–0.4)
EOSINOPHIL NFR BLD: 0 % (ref 0–7)
ERYTHROCYTE [DISTWIDTH] IN BLOOD BY AUTOMATED COUNT: 15.4 % (ref 11.5–14.5)
FLUAV SUBTYP SPEC NAA+PROBE: NOT DETECTED
FLUBV RNA SPEC QL NAA+PROBE: NOT DETECTED
GAS FLOW.O2 O2 DELIVERY SYS: ABNORMAL
GLOBULIN SER CALC-MCNC: 4.3 G/DL (ref 2–4)
GLUCOSE SERPL-MCNC: 178 MG/DL (ref 65–100)
HADV DNA SPEC QL NAA+PROBE: NOT DETECTED
HCO3 BLD-SCNC: 21.8 MMOL/L (ref 22–26)
HCOV 229E RNA SPEC QL NAA+PROBE: NOT DETECTED
HCOV HKU1 RNA SPEC QL NAA+PROBE: NOT DETECTED
HCOV NL63 RNA SPEC QL NAA+PROBE: NOT DETECTED
HCOV OC43 RNA SPEC QL NAA+PROBE: NOT DETECTED
HCT VFR BLD AUTO: 37.7 % (ref 35–47)
HGB BLD-MCNC: 12.6 G/DL (ref 11.5–16)
HMPV RNA SPEC QL NAA+PROBE: NOT DETECTED
HPIV1 RNA SPEC QL NAA+PROBE: NOT DETECTED
HPIV2 RNA SPEC QL NAA+PROBE: NOT DETECTED
HPIV3 RNA SPEC QL NAA+PROBE: DETECTED
HPIV4 RNA SPEC QL NAA+PROBE: NOT DETECTED
IMM GRANULOCYTES # BLD AUTO: 0 K/UL
IMM GRANULOCYTES NFR BLD AUTO: 0 %
LACTATE SERPL-SCNC: 1.6 MMOL/L (ref 0.4–2)
LACTATE SERPL-SCNC: 2.1 MMOL/L (ref 0.4–2)
LYMPHOCYTES # BLD: 0.5 K/UL (ref 0.8–3.5)
LYMPHOCYTES NFR BLD: 4 % (ref 12–49)
M PNEUMO DNA SPEC QL NAA+PROBE: NOT DETECTED
MCH RBC QN AUTO: 29.6 PG (ref 26–34)
MCHC RBC AUTO-ENTMCNC: 33.4 G/DL (ref 30–36.5)
MCV RBC AUTO: 88.5 FL (ref 80–99)
MONOCYTES # BLD: 0.1 K/UL (ref 0–1)
MONOCYTES NFR BLD: 1 % (ref 5–13)
NEUTS BAND NFR BLD MANUAL: 4 % (ref 0–6)
NEUTS SEG # BLD: 12.6 K/UL (ref 1.8–8)
NEUTS SEG NFR BLD: 91 % (ref 32–75)
NRBC # BLD: 0 K/UL (ref 0–0.01)
NRBC BLD-RTO: 0 PER 100 WBC
NT PRO BNP: 599 PG/ML
O2/TOTAL GAS SETTING VFR VENT: 28 %
PCO2 BLD: 34.8 MMHG (ref 35–45)
PH BLD: 7.41 (ref 7.35–7.45)
PLATELET # BLD AUTO: 166 K/UL (ref 150–400)
PMV BLD AUTO: 11 FL (ref 8.9–12.9)
PO2 BLD: 66 MMHG (ref 80–100)
POTASSIUM SERPL-SCNC: 3.5 MMOL/L (ref 3.5–5.1)
PROT SERPL-MCNC: 7 G/DL (ref 6.4–8.2)
RBC # BLD AUTO: 4.26 M/UL (ref 3.8–5.2)
RBC MORPH BLD: ABNORMAL
RBC MORPH BLD: ABNORMAL
RSV RNA SPEC QL NAA+PROBE: NOT DETECTED
RV+EV RNA SPEC QL NAA+PROBE: NOT DETECTED
SAO2 % BLD: 92.9 % (ref 92–97)
SARS-COV-2 RNA RESP QL NAA+PROBE: NOT DETECTED
SODIUM SERPL-SCNC: 136 MMOL/L (ref 136–145)
SPECIMEN HOLD: NORMAL
SPECIMEN TYPE: ABNORMAL
TROPONIN I SERPL HS-MCNC: 113 NG/L (ref 0–37)
WBC # BLD AUTO: 13.2 K/UL (ref 3.6–11)

## 2023-05-30 PROCEDURE — 83880 ASSAY OF NATRIURETIC PEPTIDE: CPT

## 2023-05-30 PROCEDURE — 96375 TX/PRO/DX INJ NEW DRUG ADDON: CPT

## 2023-05-30 PROCEDURE — 99285 EMERGENCY DEPT VISIT HI MDM: CPT

## 2023-05-30 PROCEDURE — 83605 ASSAY OF LACTIC ACID: CPT

## 2023-05-30 PROCEDURE — 82803 BLOOD GASES ANY COMBINATION: CPT

## 2023-05-30 PROCEDURE — 87040 BLOOD CULTURE FOR BACTERIA: CPT

## 2023-05-30 PROCEDURE — 85025 COMPLETE CBC W/AUTO DIFF WBC: CPT

## 2023-05-30 PROCEDURE — 1100000003 HC PRIVATE W/ TELEMETRY

## 2023-05-30 PROCEDURE — 6360000002 HC RX W HCPCS: Performed by: EMERGENCY MEDICINE

## 2023-05-30 PROCEDURE — 2580000003 HC RX 258: Performed by: EMERGENCY MEDICINE

## 2023-05-30 PROCEDURE — 6360000002 HC RX W HCPCS: Performed by: HOSPITALIST

## 2023-05-30 PROCEDURE — 93005 ELECTROCARDIOGRAM TRACING: CPT | Performed by: STUDENT IN AN ORGANIZED HEALTH CARE EDUCATION/TRAINING PROGRAM

## 2023-05-30 PROCEDURE — 71250 CT THORAX DX C-: CPT

## 2023-05-30 PROCEDURE — 84484 ASSAY OF TROPONIN QUANT: CPT

## 2023-05-30 PROCEDURE — 6370000000 HC RX 637 (ALT 250 FOR IP): Performed by: HOSPITALIST

## 2023-05-30 PROCEDURE — 96366 THER/PROPH/DIAG IV INF ADDON: CPT

## 2023-05-30 PROCEDURE — 71046 X-RAY EXAM CHEST 2 VIEWS: CPT

## 2023-05-30 PROCEDURE — 96365 THER/PROPH/DIAG IV INF INIT: CPT

## 2023-05-30 PROCEDURE — 36415 COLL VENOUS BLD VENIPUNCTURE: CPT

## 2023-05-30 PROCEDURE — 0202U NFCT DS 22 TRGT SARS-COV-2: CPT

## 2023-05-30 PROCEDURE — 80053 COMPREHEN METABOLIC PANEL: CPT

## 2023-05-30 PROCEDURE — 2580000003 HC RX 258: Performed by: HOSPITALIST

## 2023-05-30 PROCEDURE — 36600 WITHDRAWAL OF ARTERIAL BLOOD: CPT

## 2023-05-30 RX ORDER — ATORVASTATIN CALCIUM 20 MG/1
20 TABLET, FILM COATED ORAL DAILY
Status: DISCONTINUED | OUTPATIENT
Start: 2023-05-30 | End: 2023-06-02 | Stop reason: HOSPADM

## 2023-05-30 RX ORDER — IPRATROPIUM BROMIDE AND ALBUTEROL SULFATE 2.5; .5 MG/3ML; MG/3ML
1 SOLUTION RESPIRATORY (INHALATION)
Status: ACTIVE | OUTPATIENT
Start: 2023-05-30 | End: 2023-05-31

## 2023-05-30 RX ORDER — ACETAMINOPHEN 650 MG/1
650 SUPPOSITORY RECTAL EVERY 6 HOURS PRN
Status: DISCONTINUED | OUTPATIENT
Start: 2023-05-30 | End: 2023-06-02 | Stop reason: HOSPADM

## 2023-05-30 RX ORDER — SODIUM CHLORIDE 9 MG/ML
INJECTION, SOLUTION INTRAVENOUS PRN
Status: DISCONTINUED | OUTPATIENT
Start: 2023-05-30 | End: 2023-06-02 | Stop reason: HOSPADM

## 2023-05-30 RX ORDER — IPRATROPIUM BROMIDE AND ALBUTEROL SULFATE 2.5; .5 MG/3ML; MG/3ML
1 SOLUTION RESPIRATORY (INHALATION) EVERY 6 HOURS
Status: DISCONTINUED | OUTPATIENT
Start: 2023-05-30 | End: 2023-06-02

## 2023-05-30 RX ORDER — ACETAMINOPHEN 325 MG/1
650 TABLET ORAL EVERY 6 HOURS PRN
Status: DISCONTINUED | OUTPATIENT
Start: 2023-05-30 | End: 2023-06-02 | Stop reason: HOSPADM

## 2023-05-30 RX ORDER — SODIUM CHLORIDE 0.9 % (FLUSH) 0.9 %
5-40 SYRINGE (ML) INJECTION EVERY 12 HOURS SCHEDULED
Status: DISCONTINUED | OUTPATIENT
Start: 2023-05-30 | End: 2023-06-02 | Stop reason: HOSPADM

## 2023-05-30 RX ORDER — ONDANSETRON 4 MG/1
4 TABLET, ORALLY DISINTEGRATING ORAL EVERY 8 HOURS PRN
Status: DISCONTINUED | OUTPATIENT
Start: 2023-05-30 | End: 2023-06-02 | Stop reason: HOSPADM

## 2023-05-30 RX ORDER — POLYETHYLENE GLYCOL 3350 17 G/17G
17 POWDER, FOR SOLUTION ORAL DAILY PRN
Status: DISCONTINUED | OUTPATIENT
Start: 2023-05-30 | End: 2023-06-02 | Stop reason: HOSPADM

## 2023-05-30 RX ORDER — 0.9 % SODIUM CHLORIDE 0.9 %
500 INTRAVENOUS SOLUTION INTRAVENOUS ONCE
Status: COMPLETED | OUTPATIENT
Start: 2023-05-30 | End: 2023-05-30

## 2023-05-30 RX ORDER — SODIUM CHLORIDE 9 MG/ML
INJECTION, SOLUTION INTRAVENOUS CONTINUOUS
Status: DISCONTINUED | OUTPATIENT
Start: 2023-05-30 | End: 2023-05-31

## 2023-05-30 RX ORDER — GUAIFENESIN 600 MG/1
1200 TABLET, EXTENDED RELEASE ORAL 2 TIMES DAILY
Status: DISCONTINUED | OUTPATIENT
Start: 2023-05-30 | End: 2023-06-02 | Stop reason: HOSPADM

## 2023-05-30 RX ORDER — METHYLPREDNISOLONE SODIUM SUCCINATE 125 MG/2ML
80 INJECTION, POWDER, LYOPHILIZED, FOR SOLUTION INTRAMUSCULAR; INTRAVENOUS ONCE
Status: COMPLETED | OUTPATIENT
Start: 2023-05-30 | End: 2023-05-30

## 2023-05-30 RX ORDER — 0.9 % SODIUM CHLORIDE 0.9 %
1000 INTRAVENOUS SOLUTION INTRAVENOUS ONCE
Status: COMPLETED | OUTPATIENT
Start: 2023-05-30 | End: 2023-05-30

## 2023-05-30 RX ORDER — BENZONATATE 100 MG/1
100 CAPSULE ORAL 3 TIMES DAILY PRN
Status: DISCONTINUED | OUTPATIENT
Start: 2023-05-30 | End: 2023-05-31

## 2023-05-30 RX ORDER — HEPARIN SODIUM 5000 [USP'U]/ML
5000 INJECTION, SOLUTION INTRAVENOUS; SUBCUTANEOUS 2 TIMES DAILY
Status: DISCONTINUED | OUTPATIENT
Start: 2023-05-30 | End: 2023-06-02 | Stop reason: HOSPADM

## 2023-05-30 RX ORDER — SODIUM CHLORIDE 0.9 % (FLUSH) 0.9 %
5-40 SYRINGE (ML) INJECTION PRN
Status: DISCONTINUED | OUTPATIENT
Start: 2023-05-30 | End: 2023-06-02 | Stop reason: HOSPADM

## 2023-05-30 RX ORDER — ASPIRIN 81 MG/1
81 TABLET ORAL DAILY
Status: DISCONTINUED | OUTPATIENT
Start: 2023-05-30 | End: 2023-06-02 | Stop reason: HOSPADM

## 2023-05-30 RX ORDER — ONDANSETRON 2 MG/ML
4 INJECTION INTRAMUSCULAR; INTRAVENOUS EVERY 6 HOURS PRN
Status: DISCONTINUED | OUTPATIENT
Start: 2023-05-30 | End: 2023-06-02 | Stop reason: HOSPADM

## 2023-05-30 RX ORDER — PREDNISONE 20 MG/1
40 TABLET ORAL DAILY
Status: DISCONTINUED | OUTPATIENT
Start: 2023-06-02 | End: 2023-06-02 | Stop reason: HOSPADM

## 2023-05-30 RX ADMIN — SODIUM CHLORIDE 500 ML: 9 INJECTION, SOLUTION INTRAVENOUS at 15:31

## 2023-05-30 RX ADMIN — SODIUM CHLORIDE, PRESERVATIVE FREE 10 ML: 5 INJECTION INTRAVENOUS at 21:24

## 2023-05-30 RX ADMIN — SODIUM CHLORIDE 1000 ML: 9 INJECTION, SOLUTION INTRAVENOUS at 17:18

## 2023-05-30 RX ADMIN — AZITHROMYCIN MONOHYDRATE 500 MG: 500 INJECTION, POWDER, LYOPHILIZED, FOR SOLUTION INTRAVENOUS at 15:38

## 2023-05-30 RX ADMIN — IPRATROPIUM BROMIDE AND ALBUTEROL SULFATE 1 DOSE: .5; 3 SOLUTION RESPIRATORY (INHALATION) at 23:54

## 2023-05-30 RX ADMIN — ATORVASTATIN CALCIUM 20 MG: 20 TABLET, FILM COATED ORAL at 18:29

## 2023-05-30 RX ADMIN — METHYLPREDNISOLONE SODIUM SUCCINATE 60 MG: 40 INJECTION INTRAMUSCULAR; INTRAVENOUS at 18:29

## 2023-05-30 RX ADMIN — ASPIRIN 81 MG: 81 TABLET, COATED ORAL at 18:29

## 2023-05-30 RX ADMIN — BENZONATATE 100 MG: 100 CAPSULE ORAL at 21:24

## 2023-05-30 RX ADMIN — ARFORMOTEROL TARTRATE: 15 SOLUTION RESPIRATORY (INHALATION) at 21:23

## 2023-05-30 RX ADMIN — GUAIFENESIN 1200 MG: 600 TABLET, EXTENDED RELEASE ORAL at 21:24

## 2023-05-30 RX ADMIN — METHYLPREDNISOLONE SODIUM SUCCINATE 60 MG: 40 INJECTION INTRAMUSCULAR; INTRAVENOUS at 23:53

## 2023-05-30 RX ADMIN — IPRATROPIUM BROMIDE AND ALBUTEROL SULFATE 1 DOSE: .5; 3 SOLUTION RESPIRATORY (INHALATION) at 18:29

## 2023-05-30 RX ADMIN — METHYLPREDNISOLONE SODIUM SUCCINATE 80 MG: 125 INJECTION, POWDER, FOR SOLUTION INTRAMUSCULAR; INTRAVENOUS at 15:36

## 2023-05-30 RX ADMIN — CEFTRIAXONE SODIUM 1000 MG: 1 INJECTION, POWDER, FOR SOLUTION INTRAMUSCULAR; INTRAVENOUS at 15:33

## 2023-05-30 RX ADMIN — SODIUM CHLORIDE: 9 INJECTION, SOLUTION INTRAVENOUS at 18:28

## 2023-05-30 RX ADMIN — HEPARIN SODIUM 5000 UNITS: 5000 INJECTION INTRAVENOUS; SUBCUTANEOUS at 21:25

## 2023-05-30 ASSESSMENT — ENCOUNTER SYMPTOMS
COUGH: 1
ABDOMINAL PAIN: 0
CHEST TIGHTNESS: 1
SHORTNESS OF BREATH: 1
SINUS PRESSURE: 0

## 2023-05-30 ASSESSMENT — PAIN - FUNCTIONAL ASSESSMENT: PAIN_FUNCTIONAL_ASSESSMENT: NONE - DENIES PAIN

## 2023-05-30 NOTE — ED NOTES
Bedside and Verbal shift change report given to [address] [place] [address] [address] (oncoming nurse) by Clarisa Madsen RN (offgoing nurse). Report included the following information ED Encounter Summary, ED SBAR, MAR, and Recent Results.        Marek Reeves RN  05/30/23 Dayna Gregory

## 2023-05-30 NOTE — ED NOTES
Verbal shift change report given to Edis Dupree (oncoming nurse) by Karlene Garcia (offgoing nurse). Report included the following information ED Encounter Summary, ED SBAR, MAR, and Recent Results.        Chetna Connell RN  05/30/23 1950

## 2023-05-30 NOTE — ED TRIAGE NOTES
Pt c/o increased SOB and hypotension. Pt reports she was seen by [facility] prior to arrival and told her O2 sats were in the 80's. Pt arrive on 3L nasal cannula with O2 sats of 93%.   Hx COPD

## 2023-05-30 NOTE — ED PROVIDER NOTES
Pupils are equal, round, and reactive to light. Cardiovascular:      Rate and Rhythm: Normal rate and regular rhythm. Pulmonary:      Comments: Diffuse inspiratory and expiratory wheezing, rhonchi noted right middle and right upper lobe, no conversational dyspnea  Chest:      Chest wall: No tenderness. Musculoskeletal:         General: Normal range of motion. Skin:     General: Skin is warm and dry. Neurological:      General: No focal deficit present. DIAGNOSTIC RESULTS     EKG: All EKG's are interpreted by the Emergency Department Physician who either signs or Co-signs this chart in the absence of a cardiologist.        RADIOLOGY:   Non-plain film images such as CT, Ultrasound and MRI are read by the radiologist. Plain radiographic images are visualized and preliminarily interpreted by the emergency physician with the below findings:        Interpretation per the Radiologist below, if available at the time of this note:    XR CHEST (2 VW)   Final Result   Right upper lung and right basilar interstitial and patchy airspace   disease.                LABS:  Labs Reviewed   COMPREHENSIVE METABOLIC PANEL - Abnormal; Notable for the following components:       Result Value    Glucose 178 (*)     BUN 38 (*)     Creatinine 1.38 (*)     Bun/Cre Ratio 28 (*)     Est, Glom Filt Rate 39 (*)     Albumin 2.7 (*)     Globulin 4.3 (*)     Albumin/Globulin Ratio 0.6 (*)     All other components within normal limits   CBC WITH AUTO DIFFERENTIAL - Abnormal; Notable for the following components:    WBC 13.2 (*)     RDW 15.4 (*)     Neutrophils % 91 (*)     Lymphocytes % 4 (*)     Monocytes % 1 (*)     Neutrophils Absolute 12.6 (*)     Lymphocytes Absolute 0.5 (*)     All other components within normal limits   TROPONIN - Abnormal; Notable for the following components:    Troponin, High Sensitivity 113 (*)     All other components within normal limits   BRAIN NATRIURETIC PEPTIDE - Abnormal; Notable for the following

## 2023-05-30 NOTE — H&P
have been completed with Dragon, the computer voice recognition software. Quite often unanticipated grammatical, syntax, homophones, and other interpretive errors are inadvertently transcribed by the computer software. Please disregard these errors. Please excuse any errors that have escaped final proofreading.

## 2023-05-31 LAB
ALBUMIN SERPL-MCNC: 2.5 G/DL (ref 3.5–5)
ALBUMIN/GLOB SERPL: 0.7 (ref 1.1–2.2)
ALP SERPL-CCNC: 115 U/L (ref 45–117)
ALT SERPL-CCNC: 41 U/L (ref 12–78)
ANION GAP SERPL CALC-SCNC: 8 MMOL/L (ref 5–15)
ARTERIAL PATENCY WRIST A: POSITIVE
AST SERPL-CCNC: 23 U/L (ref 15–37)
BASE EXCESS BLD CALC-SCNC: 0.3 MMOL/L
BDY SITE: ABNORMAL
BILIRUB SERPL-MCNC: 0.4 MG/DL (ref 0.2–1)
BUN SERPL-MCNC: 24 MG/DL (ref 6–20)
BUN/CREAT SERPL: 27 (ref 12–20)
CALCIUM SERPL-MCNC: 9.3 MG/DL (ref 8.5–10.1)
CHLORIDE SERPL-SCNC: 111 MMOL/L (ref 97–108)
CO2 SERPL-SCNC: 22 MMOL/L (ref 21–32)
CREAT SERPL-MCNC: 0.88 MG/DL (ref 0.55–1.02)
ERYTHROCYTE [DISTWIDTH] IN BLOOD BY AUTOMATED COUNT: 15.5 % (ref 11.5–14.5)
GAS FLOW.O2 O2 DELIVERY SYS: ABNORMAL
GLOBULIN SER CALC-MCNC: 3.5 G/DL (ref 2–4)
GLUCOSE SERPL-MCNC: 172 MG/DL (ref 65–100)
HCO3 BLD-SCNC: 24.2 MMOL/L (ref 22–26)
HCT VFR BLD AUTO: 35.5 % (ref 35–47)
HGB BLD-MCNC: 11.5 G/DL (ref 11.5–16)
MAGNESIUM SERPL-MCNC: 2.3 MG/DL (ref 1.6–2.4)
MCH RBC QN AUTO: 29.3 PG (ref 26–34)
MCHC RBC AUTO-ENTMCNC: 32.4 G/DL (ref 30–36.5)
MCV RBC AUTO: 90.6 FL (ref 80–99)
NRBC # BLD: 0 K/UL (ref 0–0.01)
NRBC BLD-RTO: 0 PER 100 WBC
O2/TOTAL GAS SETTING VFR VENT: 3 %
PCO2 BLD: 35.6 MMHG (ref 35–45)
PH BLD: 7.44 (ref 7.35–7.45)
PLATELET # BLD AUTO: 149 K/UL (ref 150–400)
PMV BLD AUTO: 10.8 FL (ref 8.9–12.9)
PO2 BLD: 76 MMHG (ref 80–100)
POTASSIUM SERPL-SCNC: 4 MMOL/L (ref 3.5–5.1)
PROT SERPL-MCNC: 6 G/DL (ref 6.4–8.2)
RBC # BLD AUTO: 3.92 M/UL (ref 3.8–5.2)
SAO2 % BLD: 95.6 % (ref 92–97)
SODIUM SERPL-SCNC: 141 MMOL/L (ref 136–145)
SPECIMEN TYPE: ABNORMAL
WBC # BLD AUTO: 9.8 K/UL (ref 3.6–11)

## 2023-05-31 PROCEDURE — 94640 AIRWAY INHALATION TREATMENT: CPT

## 2023-05-31 PROCEDURE — 80053 COMPREHEN METABOLIC PANEL: CPT

## 2023-05-31 PROCEDURE — 97535 SELF CARE MNGMENT TRAINING: CPT

## 2023-05-31 PROCEDURE — 83735 ASSAY OF MAGNESIUM: CPT

## 2023-05-31 PROCEDURE — 1100000000 HC RM PRIVATE

## 2023-05-31 PROCEDURE — 97165 OT EVAL LOW COMPLEX 30 MIN: CPT

## 2023-05-31 PROCEDURE — 97116 GAIT TRAINING THERAPY: CPT

## 2023-05-31 PROCEDURE — 97161 PT EVAL LOW COMPLEX 20 MIN: CPT

## 2023-05-31 PROCEDURE — 2580000003 HC RX 258: Performed by: HOSPITALIST

## 2023-05-31 PROCEDURE — 6370000000 HC RX 637 (ALT 250 FOR IP): Performed by: HOSPITALIST

## 2023-05-31 PROCEDURE — 6360000002 HC RX W HCPCS: Performed by: HOSPITALIST

## 2023-05-31 PROCEDURE — 87449 NOS EACH ORGANISM AG IA: CPT

## 2023-05-31 PROCEDURE — 36415 COLL VENOUS BLD VENIPUNCTURE: CPT

## 2023-05-31 PROCEDURE — 85027 COMPLETE CBC AUTOMATED: CPT

## 2023-05-31 RX ORDER — POTASSIUM CHLORIDE 750 MG/1
10 CAPSULE, EXTENDED RELEASE ORAL
Status: ON HOLD | COMMUNITY
End: 2023-06-02 | Stop reason: HOSPADM

## 2023-05-31 RX ORDER — ALENDRONATE SODIUM 70 MG/1
70 TABLET ORAL
COMMUNITY

## 2023-05-31 RX ORDER — ERGOCALCIFEROL 1.25 MG/1
50000 CAPSULE ORAL WEEKLY
COMMUNITY

## 2023-05-31 RX ORDER — BENZONATATE 100 MG/1
200 CAPSULE ORAL 3 TIMES DAILY
Status: DISCONTINUED | OUTPATIENT
Start: 2023-05-31 | End: 2023-06-02 | Stop reason: HOSPADM

## 2023-05-31 RX ORDER — FLUTICASONE PROPIONATE AND SALMETEROL 500; 50 UG/1; UG/1
1 POWDER RESPIRATORY (INHALATION) 2 TIMES DAILY
COMMUNITY

## 2023-05-31 RX ADMIN — METHYLPREDNISOLONE SODIUM SUCCINATE 60 MG: 40 INJECTION INTRAMUSCULAR; INTRAVENOUS at 22:49

## 2023-05-31 RX ADMIN — HEPARIN SODIUM 5000 UNITS: 5000 INJECTION INTRAVENOUS; SUBCUTANEOUS at 09:13

## 2023-05-31 RX ADMIN — BENZONATATE 200 MG: 100 CAPSULE ORAL at 21:03

## 2023-05-31 RX ADMIN — GUAIFENESIN 1200 MG: 600 TABLET, EXTENDED RELEASE ORAL at 09:13

## 2023-05-31 RX ADMIN — HEPARIN SODIUM 5000 UNITS: 5000 INJECTION INTRAVENOUS; SUBCUTANEOUS at 21:02

## 2023-05-31 RX ADMIN — ARFORMOTEROL TARTRATE: 15 SOLUTION RESPIRATORY (INHALATION) at 19:23

## 2023-05-31 RX ADMIN — METHYLPREDNISOLONE SODIUM SUCCINATE 60 MG: 40 INJECTION INTRAMUSCULAR; INTRAVENOUS at 06:34

## 2023-05-31 RX ADMIN — IPRATROPIUM BROMIDE AND ALBUTEROL SULFATE 1 DOSE: .5; 3 SOLUTION RESPIRATORY (INHALATION) at 13:51

## 2023-05-31 RX ADMIN — IPRATROPIUM BROMIDE AND ALBUTEROL SULFATE 1 DOSE: .5; 3 SOLUTION RESPIRATORY (INHALATION) at 06:34

## 2023-05-31 RX ADMIN — GUAIFENESIN 1200 MG: 600 TABLET, EXTENDED RELEASE ORAL at 21:03

## 2023-05-31 RX ADMIN — SODIUM CHLORIDE, PRESERVATIVE FREE 10 ML: 5 INJECTION INTRAVENOUS at 21:03

## 2023-05-31 RX ADMIN — METHYLPREDNISOLONE SODIUM SUCCINATE 60 MG: 40 INJECTION INTRAMUSCULAR; INTRAVENOUS at 11:46

## 2023-05-31 RX ADMIN — AZITHROMYCIN MONOHYDRATE 500 MG: 500 INJECTION, POWDER, LYOPHILIZED, FOR SOLUTION INTRAVENOUS at 14:04

## 2023-05-31 RX ADMIN — CEFTRIAXONE SODIUM 1000 MG: 1 INJECTION, POWDER, FOR SOLUTION INTRAMUSCULAR; INTRAVENOUS at 11:46

## 2023-05-31 RX ADMIN — ATORVASTATIN CALCIUM 20 MG: 20 TABLET, FILM COATED ORAL at 09:13

## 2023-05-31 RX ADMIN — ASPIRIN 81 MG: 81 TABLET, COATED ORAL at 09:13

## 2023-05-31 RX ADMIN — BENZONATATE 200 MG: 100 CAPSULE ORAL at 14:04

## 2023-05-31 RX ADMIN — IPRATROPIUM BROMIDE AND ALBUTEROL SULFATE 1 DOSE: .5; 3 SOLUTION RESPIRATORY (INHALATION) at 19:23

## 2023-05-31 RX ADMIN — METHYLPREDNISOLONE SODIUM SUCCINATE 60 MG: 40 INJECTION INTRAMUSCULAR; INTRAVENOUS at 16:17

## 2023-05-31 NOTE — CONSULTS
PULMONARY/CRITICAL CARE/SLEEP MEDICINE    Initial Physician Consultation Note    Name: Chris Valentino   : 1945   MRN: [id number]   Date: 2023      Subjective:   Consult Note: 2023     This patient has been seen and evaluated at the request of Dr. Vineet Springer for COPD. Medical records and data reviewed. Patient is a 65 y.o. female who presented to the hospital with complaints of dyspnea. Patient reports she has had increasing trouble with fatigue and shortness of breath for several days that she attributed to allergies and was prescribed nasal sprays as well as outpatient antibiotics that did not lead to any improvement. She presented to the hospital for further evaluation and was found to have right-sided pneumonia. She denies coughing spells for eating but has been told of aspiration in the past.  She reports she has a diagnosis of COPD, prior FEV1 is unknown. She is on bronchodilators that is managed by her primary care physician and she is not known to our practice. She is currently not on oxygen at home. She does report about 10 pound weight loss over the past several months. Previous chest imaging that was done more than a year ago had shown parenchymal abnormality on the right lung. CT scan of the chest suggest presence of pneumonic infiltrates, nodular density as well as interstitial lung disease      Assessment:     Acute hypoxic pulmonary insufficiency  Right-sided pneumonia, rule out aspiration  Underlying COPD, currently not bronchospastic on examination  Interstitial lung disease on CT  Recent weight loss, etiology undetermined  Other medical problems per chart      Recommendations:      On oxygen, wean as tolerated, assess for home oxygen closer to discharge  On empiric antibiotics, follow cultures  On bronchodilators  Wean prednisone as tolerated  Speech eval  Follow-up chest imaging in 8 weeks to ensure complete improvement, consider high-resolution CT scan of the chest for

## 2023-05-31 NOTE — ED NOTES
TRANSFER - OUT REPORT:    Verbal report given to Julia on Aneudy Parents  being transferred to [facility] for routine progression of patient care       Report consisted of patient's Situation, Background, Assessment and   Recommendations(SBAR). Information from the following report(s) Nurse Handoff Report, Index, ED Encounter Summary, ED SBAR, Intake/Output, MAR, Recent Results, Med Rec Status, and Cardiac Rhythm nsr  was reviewed with the receiving nurse. Grahn Assessment: Presents to emergency department  because of falls (Syncope, seizure, or loss of consciousness): No, Age > 79: Yes, Altered Mental Status, Intoxication with alcohol or substance confusion (Disorientation, impaired judgment, poor safety awaremess, or inability to follow instructions): No, Impaired Mobility: Ambulates or transfers with assistive devices or assistance; Unable to ambulate or transer.: Yes, Nursing Judgement: Yes  Lines:   Peripheral IV 05/30/23 Right Antecubital (Active)   Site Assessment Clean, dry & intact 05/30/23 1335   Line Status Blood return noted 05/30/23 1335   Phlebitis Assessment No symptoms 05/30/23 1335   Infiltration Assessment 0 05/30/23 1335   Alcohol Cap Used No 05/30/23 1335   Dressing Status Clean, dry & intact 05/30/23 1335   Dressing Type Transparent 05/30/23 1335   Dressing Intervention New 05/30/23 1335       Peripheral IV 05/30/23 Left Antecubital (Active)   Site Assessment Clean, dry & intact 05/30/23 1946        Opportunity for questions and clarification was provided.       Patient transported with:  Monitor, O2 @ 2lpm, and Registered Nurse           Cindy Finley RN  05/31/23 3889

## 2023-05-31 NOTE — PLAN OF CARE
Problem: Physical Therapy - Adult  Goal: By Discharge: Performs mobility at highest level of function for planned discharge setting. See evaluation for individualized goals. Description: FUNCTIONAL STATUS PRIOR TO ADMISSION: Patient was independent and active without use of DME. Patient denies hx falls. Reports active lifestyle enjoying gardening and singing in [employer]. HOME SUPPORT PRIOR TO ADMISSION: The patient lived with daughter but did not require assistance. Physical Therapy Goals  Initiated 5/31/2023  1. Patient will move from supine to sit and sit to supine in bed with independence within 7 day(s). 2.  Patient will perform sit to stand with independence within 7 day(s). 3.  Patient will transfer from bed to chair and chair to bed with independence using the least restrictive device within 7 day(s). 4.  Patient will ambulate with independence for 150 feet with the least restrictive device within 7 day(s). 5.  Patient will ascend/descend 10 stairs with one handrail(s) with modified independence within 7 day(s). Outcome: Progressing   PHYSICAL THERAPY EVALUATION    Patient: Nolan Zelaya (2 y.o. female)  Date: 5/31/2023  Primary Diagnosis: Hypoxemia [R09.02]  Acute kidney injury ([address] [N17.9]  Pneumonia of right upper lobe due to infectious organism [J18.9]  Acute and chr resp failure, unsp w hypoxia or hypercapnia ([address] [J96.20]       Precautions: Fall Risk                    ASSESSMENT :   DEFICITS/IMPAIRMENTS:   The patient is limited by decreased activity tolerance and generalized weakness in setting of hospital admission for SOB, found to have PNA and flu +. Patient found supine in bed and agreeable to PT. Spo2 98% on 2LPM, and spo2 remained stable with patient at rest and with activity once weaned to RA. Patient overall supervision - SBA level for bed mobility, transfers, and ambulation of 30 feet with mild instability though no LOB.  Patient reporting only mild ULLOA with

## 2023-05-31 NOTE — CARE COORDINATION
Care Management Initial Assessment       RUR:11%  Readmission? No  1st IM letter given? Yes - 5/30  1st  letter given: No       05/31/23 1611   Service Assessment   Patient Orientation Alert and Oriented;Person;Place;Situation   Cognition Alert   History Provided By Patient   Primary Caregiver Self   Accompanied By/Relationship son   Support Systems Children;Family Members   PCP Verified by CM Yes  ([facility] physicians)   Last Visit to PCP Within last 3 months   Prior Functional Level Independent in ADLs/IADLs   Current Functional Level Independent in ADLs/IADLs   Can patient return to prior living arrangement Yes   Ability to make needs known: Good   Family able to assist with home care needs: Yes   Would you like for me to discuss the discharge plan with any other family members/significant others, and if so, who? No   Financial Resources Medicare   Social/Functional History   Lives With Alone  (currently lives with family for home renovation)   Type of [address] to enter with rails   Entrance Stairs - Rails Both   Bathroom Shower/Tub Tub/Shower unit   [address]   Ambulation Assistance Independent   Transfer Assistance Independent   Active  Yes   Mode of Transportation Car   Occupation Retired   Discharge Planning   Current Services Prior To Admission None   Patient expects to be discharged to: House   History of falls? 0   Services At/After Discharge   Transition of Care Consult (CM Consult) Discharge [address] Provided? No   Mode of Transport at Discharge Other (see comment)  (family)   Confirm Follow Up Transport Family   Condition of Participation: Discharge Planning   The Patient and/or Patient Representative was provided with a Choice of Provider?  Patient

## 2023-06-01 PROCEDURE — 6360000002 HC RX W HCPCS: Performed by: HOSPITALIST

## 2023-06-01 PROCEDURE — 6370000000 HC RX 637 (ALT 250 FOR IP): Performed by: HOSPITALIST

## 2023-06-01 PROCEDURE — 92610 EVALUATE SWALLOWING FUNCTION: CPT

## 2023-06-01 PROCEDURE — 94640 AIRWAY INHALATION TREATMENT: CPT

## 2023-06-01 PROCEDURE — 1100000000 HC RM PRIVATE

## 2023-06-01 PROCEDURE — 2580000003 HC RX 258: Performed by: HOSPITALIST

## 2023-06-01 RX ADMIN — METHYLPREDNISOLONE SODIUM SUCCINATE 60 MG: 40 INJECTION INTRAMUSCULAR; INTRAVENOUS at 05:28

## 2023-06-01 RX ADMIN — BENZONATATE 200 MG: 100 CAPSULE ORAL at 09:19

## 2023-06-01 RX ADMIN — HEPARIN SODIUM 5000 UNITS: 5000 INJECTION INTRAVENOUS; SUBCUTANEOUS at 20:39

## 2023-06-01 RX ADMIN — IPRATROPIUM BROMIDE AND ALBUTEROL SULFATE 1 DOSE: .5; 3 SOLUTION RESPIRATORY (INHALATION) at 01:44

## 2023-06-01 RX ADMIN — ATORVASTATIN CALCIUM 20 MG: 20 TABLET, FILM COATED ORAL at 09:19

## 2023-06-01 RX ADMIN — BENZONATATE 200 MG: 100 CAPSULE ORAL at 15:30

## 2023-06-01 RX ADMIN — IPRATROPIUM BROMIDE AND ALBUTEROL SULFATE 1 DOSE: .5; 3 SOLUTION RESPIRATORY (INHALATION) at 21:22

## 2023-06-01 RX ADMIN — SODIUM CHLORIDE, PRESERVATIVE FREE 10 ML: 5 INJECTION INTRAVENOUS at 09:35

## 2023-06-01 RX ADMIN — ASPIRIN 81 MG: 81 TABLET, COATED ORAL at 09:19

## 2023-06-01 RX ADMIN — SODIUM CHLORIDE, PRESERVATIVE FREE 10 ML: 5 INJECTION INTRAVENOUS at 20:47

## 2023-06-01 RX ADMIN — IPRATROPIUM BROMIDE AND ALBUTEROL SULFATE 1 DOSE: .5; 3 SOLUTION RESPIRATORY (INHALATION) at 08:21

## 2023-06-01 RX ADMIN — BENZONATATE 200 MG: 100 CAPSULE ORAL at 20:39

## 2023-06-01 RX ADMIN — HEPARIN SODIUM 5000 UNITS: 5000 INJECTION INTRAVENOUS; SUBCUTANEOUS at 09:20

## 2023-06-01 RX ADMIN — ARFORMOTEROL TARTRATE: 15 SOLUTION RESPIRATORY (INHALATION) at 08:20

## 2023-06-01 RX ADMIN — AZITHROMYCIN MONOHYDRATE 500 MG: 500 INJECTION, POWDER, LYOPHILIZED, FOR SOLUTION INTRAVENOUS at 13:08

## 2023-06-01 RX ADMIN — METHYLPREDNISOLONE SODIUM SUCCINATE 60 MG: 40 INJECTION INTRAMUSCULAR; INTRAVENOUS at 11:53

## 2023-06-01 RX ADMIN — CEFTRIAXONE SODIUM 1000 MG: 1 INJECTION, POWDER, FOR SOLUTION INTRAMUSCULAR; INTRAVENOUS at 11:50

## 2023-06-01 RX ADMIN — GUAIFENESIN 1200 MG: 600 TABLET, EXTENDED RELEASE ORAL at 09:20

## 2023-06-01 RX ADMIN — GUAIFENESIN 1200 MG: 600 TABLET, EXTENDED RELEASE ORAL at 20:39

## 2023-06-01 RX ADMIN — IPRATROPIUM BROMIDE AND ALBUTEROL SULFATE 1 DOSE: .5; 3 SOLUTION RESPIRATORY (INHALATION) at 13:37

## 2023-06-01 RX ADMIN — ARFORMOTEROL TARTRATE: 15 SOLUTION RESPIRATORY (INHALATION) at 21:22

## 2023-06-02 VITALS
WEIGHT: 108.91 LBS | HEART RATE: 92 BPM | HEIGHT: 67 IN | TEMPERATURE: 97.6 F | SYSTOLIC BLOOD PRESSURE: 139 MMHG | DIASTOLIC BLOOD PRESSURE: 71 MMHG | BODY MASS INDEX: 17.09 KG/M2 | RESPIRATION RATE: 18 BRPM | OXYGEN SATURATION: 94 %

## 2023-06-02 PROBLEM — J96.20 ACUTE AND CHR RESP FAILURE, UNSP W HYPOXIA OR HYPERCAPNIA (HCC): Status: RESOLVED | Noted: 2023-05-30 | Resolved: 2023-06-02

## 2023-06-02 LAB
ERYTHROCYTE [DISTWIDTH] IN BLOOD BY AUTOMATED COUNT: 15.9 % (ref 11.5–14.5)
HCT VFR BLD AUTO: 31.6 % (ref 35–47)
HGB BLD-MCNC: 10.4 G/DL (ref 11.5–16)
L PNEUMO1 AG UR QL IA: NEGATIVE
MCH RBC QN AUTO: 29.7 PG (ref 26–34)
MCHC RBC AUTO-ENTMCNC: 32.9 G/DL (ref 30–36.5)
MCV RBC AUTO: 90.3 FL (ref 80–99)
NRBC # BLD: 0 K/UL (ref 0–0.01)
NRBC BLD-RTO: 0 PER 100 WBC
PLATELET # BLD AUTO: 176 K/UL (ref 150–400)
PMV BLD AUTO: 10.9 FL (ref 8.9–12.9)
RBC # BLD AUTO: 3.5 M/UL (ref 3.8–5.2)
SPECIMEN SOURCE: NORMAL
WBC # BLD AUTO: 9.6 K/UL (ref 3.6–11)

## 2023-06-02 PROCEDURE — 6370000000 HC RX 637 (ALT 250 FOR IP): Performed by: HOSPITALIST

## 2023-06-02 PROCEDURE — 2580000003 HC RX 258: Performed by: HOSPITALIST

## 2023-06-02 PROCEDURE — 94640 AIRWAY INHALATION TREATMENT: CPT

## 2023-06-02 PROCEDURE — 6360000002 HC RX W HCPCS: Performed by: HOSPITALIST

## 2023-06-02 PROCEDURE — 85027 COMPLETE CBC AUTOMATED: CPT

## 2023-06-02 PROCEDURE — 36415 COLL VENOUS BLD VENIPUNCTURE: CPT

## 2023-06-02 RX ORDER — LEVOFLOXACIN 250 MG/1
250 TABLET ORAL DAILY
Qty: 3 TABLET | Refills: 0 | Status: SHIPPED | OUTPATIENT
Start: 2023-06-02 | End: 2023-06-05

## 2023-06-02 RX ORDER — BENZONATATE 200 MG/1
200 CAPSULE ORAL 3 TIMES DAILY
Qty: 21 CAPSULE | Refills: 0 | Status: SHIPPED | OUTPATIENT
Start: 2023-06-02 | End: 2023-06-09

## 2023-06-02 RX ORDER — IPRATROPIUM BROMIDE AND ALBUTEROL SULFATE 2.5; .5 MG/3ML; MG/3ML
1 SOLUTION RESPIRATORY (INHALATION) EVERY 6 HOURS PRN
Status: DISCONTINUED | OUTPATIENT
Start: 2023-06-02 | End: 2023-06-02 | Stop reason: HOSPADM

## 2023-06-02 RX ORDER — PREDNISONE 20 MG/1
40 TABLET ORAL DAILY
Qty: 4 TABLET | Refills: 0 | Status: SHIPPED | OUTPATIENT
Start: 2023-06-03 | End: 2023-06-05

## 2023-06-02 RX ADMIN — GUAIFENESIN 1200 MG: 600 TABLET, EXTENDED RELEASE ORAL at 09:46

## 2023-06-02 RX ADMIN — ATORVASTATIN CALCIUM 20 MG: 20 TABLET, FILM COATED ORAL at 09:47

## 2023-06-02 RX ADMIN — PREDNISONE 40 MG: 20 TABLET ORAL at 09:47

## 2023-06-02 RX ADMIN — SODIUM CHLORIDE, PRESERVATIVE FREE 10 ML: 5 INJECTION INTRAVENOUS at 09:48

## 2023-06-02 RX ADMIN — HEPARIN SODIUM 5000 UNITS: 5000 INJECTION INTRAVENOUS; SUBCUTANEOUS at 08:30

## 2023-06-02 RX ADMIN — BENZONATATE 200 MG: 100 CAPSULE ORAL at 09:46

## 2023-06-02 RX ADMIN — ASPIRIN 81 MG: 81 TABLET, COATED ORAL at 09:46

## 2023-06-02 RX ADMIN — ARFORMOTEROL TARTRATE: 15 SOLUTION RESPIRATORY (INHALATION) at 07:08

## 2023-06-02 RX ADMIN — IPRATROPIUM BROMIDE AND ALBUTEROL SULFATE 1 DOSE: .5; 3 SOLUTION RESPIRATORY (INHALATION) at 03:22

## 2023-06-02 RX ADMIN — IPRATROPIUM BROMIDE AND ALBUTEROL SULFATE 1 DOSE: .5; 3 SOLUTION RESPIRATORY (INHALATION) at 07:08

## 2023-06-02 NOTE — DISCHARGE SUMMARY
test results are still pending:     [facility]LLOW [facility] APPOINTMENTS:    [facility]     ADDITIONAL CARE RECOMMENDATIONS:     DIET: cardiac diet    ACTIVITY: activity as tolerated    WOUND CARE:     EQUIPMENT needed:       DISCHARGE MEDICATIONS:     Medication List        START taking these medications      benzonatate 200 MG capsule  Commonly known as: TESSALON  Take 1 capsule by mouth in the morning, at noon, and at bedtime for 7 days     levoFLOXacin 250 MG tablet  Commonly known as: LEVAQUIN  Take 1 tablet by mouth daily for 3 days     predniSONE 20 MG tablet  Commonly known as: DELTASONE  Take 2 tablets by mouth daily for 2 doses  Start taking on: Lory 3, 2023            [address] taking these medications      albuterol sulfate  (90 Base) MCG/ACT inhaler  Commonly known as: PROVENTIL;VENTOLIN;PROAIR     alendronate 70 MG tablet  Commonly known as: FOSAMAX     aspirin 81 MG EC tablet     atorvastatin 20 MG tablet  Commonly known as: LIPITOR     fluticasone-salmeterol 500-50 MCG/ACT Aepb diskus inhaler  Commonly known as: ADVAIR     VITAMIN B COMPLEX PO     vitamin D 1.25 MG ([address] UT) Caps capsule  Commonly known as: ERGOCALCIFEROL            STOP taking these medications      potassium chloride 10 MEQ extended release capsule  Commonly known as: MICRO-K               Where to Get Your Medications        These medications were sent to [facility]/pharmacy #[id number]- [place], [address] [facility] [phone] Connor Rodriguez [phone] [address], [address] [place] [address]      Phone: [phone]   benzonatate 200 MG capsule  levoFLOXacin 250 MG tablet  predniSONE 20 MG tablet           NOTIFY YOUR PHYSICIAN FOR ANY OF THE FOLLOWING:   Fever over 101 degrees for 24 hours. Chest pain, shortness of breath, fever, chills, nausea, vomiting, diarrhea, change in mentation, falling, weakness, bleeding. Severe pain or pain not relieved by medications.   Or, any other signs or symptoms that you may have questions

## 2023-06-02 NOTE — PROGRESS NOTES
Bedside report given. PT is alert and oriented and is walking around the room. PT is looking forward to being Discharged she stated.
Hospital follow-up PCP transitional care appointment has been scheduled with Dr. Charlene Carreon for Monday June 5, 2023 at 4:10 PM.? This is a previously scheduled appointment. Pending patient discharge.   Klarissa Patel, Care Management Assistant
Hospitalist Progress Note  Kee Au MD  Answering service:         Date of Service:  2023  NAME:  Chad Roach  :  1945  MRN:  [id number]      Admission Summary:   76-year-old -American female h/o HLP, COPD not on home o2. presents emergency department chief complaint of increasing shortness of breath. Patient states that she has had increasing cough fatigue and shortness of breath over the last several days and has worsened today. She is normally active in her garden but has been unable to do her normal activities. She reports a history of COPD but is not currently on oxygen nor has she been on any recent antibiotics or steroids. She went to her pcp's office due to her severe sob. Prescribed with inhalers, but continue to get worse, so came to ER. At bedside states that she is concerned she  may require oxygen supplementation at home as she appears to get short of breath quickly when going up and down stairs. + running nose, 1 week before  Denied fever. + cough. At triage pt is hypotensive BP 77/54, improved after NS bolus. SaO2 down to 80's improved after 3L o2   She also received iv rocephin, azitrho., solumedrol in ER. Denied similar sick contact. Ex smoker. Interval history / Subjective:   + cough  Breathing better   No fever , wean off O2     XR CHEST (2 VW)    Result Date: 2023  Right upper lung and right basilar interstitial and patchy airspace disease. CT CHEST WO CONTRAST    Result Date: 2023  1. Right lower lobe airspace disease is consistent with pneumonia. Recommend treatment as appropriate and follow-up imaging to document resolution. 2.  Persistent septal thickening and groundglass opacity in the dependent portion of the right upper lobe with some interval reduction. Findings most likely represent chronic interstitial lung disease.  3.  Mild
I prayed with Amanda Villasenor and celebrated with her the [address].   [employer] Sep
OCCUPATIONAL THERAPY EVALUATION/DISCHARGE  Patient: Chris Valentino (54 y.o. female)  Date: 5/31/2023  Primary Diagnosis: Hypoxemia [R09.02]  Acute kidney injury ([facility] [address] [N17.9]  Pneumonia of right upper lobe due to infectious organism [J18.9]  Acute and chr resp failure, unsp w hypoxia or hypercapnia ([address] [J96.20]         Precautions: Fall Risk                  ASSESSMENT :  Based on the objective data below, the patient presents at her functional baseline limited only at this time by decreased activity tolerance s/p admission for SOB with found PNA/flu. Pt received semi-supine and agreeable to participation in therapy session. She completed bed mobility, functional transfers, progression to bathroom, and OOB ADLs with mod I to supervision for safety within the hospital environment. SpO2 >95% on 2L and therefore titrated down to RA with SpO2 remaining >90% throughout session. Pt returned to semi-supine at end of session and left with all needs met. RN notified. As pt is at her functional baseline, acute OT will sign off. Recommend d/c home with assistance from family as needed for IADLs. Functional Outcome Measure: The patient scored 21/24 on the [facility] Daily Activity Shortform outcome measure. Further skilled acute occupational therapy is not indicated at this time. PLAN :    Recommendation for discharge: (in order for the patient to meet his/her long term goals): No skilled occupational therapy    Other factors to consider for discharge: no additional factors    IF patient discharges home will need the following DME: none       SUBJECTIVE:   Patient stated, You know you should see my kids.     OBJECTIVE DATA SUMMARY:     Past Medical History:   Diagnosis Date    Elevated cholesterol      Past Surgical History:   Procedure Laterality Date    COLONOSCOPY N/A 7/18/2022    COLONOSCOPY performed by Benji Simmons MD at [facility] ENDOSCOPY    COLONOSCOPY N/A 9/20/2016    COLONOSCOPY performed by
PULMONARY/CRITICAL CARE/SLEEP MEDICINE    Progress Note    Name: Juan R Jacobsen   : 1945   MRN: [id number]   Date: 2023      Subjective:       Resting on room air     Consult Note:     This patient has been seen and evaluated at the request of Dr. Hermes Schwarz for COPD. Medical records and data reviewed. Patient is a 65 y.o. female who presented to the hospital with complaints of dyspnea. Patient reports she has had increasing trouble with fatigue and shortness of breath for several days that she attributed to allergies and was prescribed nasal sprays as well as outpatient antibiotics that did not lead to any improvement. She presented to the hospital for further evaluation and was found to have right-sided pneumonia. She denies coughing spells for eating but has been told of aspiration in the past.  She reports she has a diagnosis of COPD, prior FEV1 is unknown. She is on bronchodilators that is managed by her primary care physician and she is not known to our practice. She is currently not on oxygen at home. She does report about 10 pound weight loss over the past several months. Previous chest imaging that was done more than a year ago had shown parenchymal abnormality on the right lung. CT scan of the chest suggest presence of pneumonic infiltrates, nodular density as well as interstitial lung disease      Assessment:     Acute hypoxic pulmonary insufficiency  Right-sided pneumonia, rule out aspiration  Underlying COPD, currently not bronchospastic on examination  Interstitial lung disease on CT  Recent weight loss, etiology undetermined  Other medical problems per chart      Recommendations:        On empiric antibiotics, follow cultures  On bronchodilators  O2 titration above 90%   Wean prednisone as tolerated  Speech eval  Follow-up chest imaging in 8 weeks to ensure complete improvement, consider high-resolution CT scan of the chest for categorization of interstitial lung disease  Outpatient
PULMONARY/CRITICAL CARE/SLEEP MEDICINE    Progress Note    Name: Shaylee Nicole   : 1945   MRN: [id number]   Date: 2023      Subjective:       Feeling better today  No congestion reported today       Resting on room air     Consult Note:     This patient has been seen and evaluated at the request of Dr. Vishnu Castañeda for COPD. Medical records and data reviewed. Patient is a 65 y.o. female who presented to the hospital with complaints of dyspnea. Patient reports she has had increasing trouble with fatigue and shortness of breath for several days that she attributed to allergies and was prescribed nasal sprays as well as outpatient antibiotics that did not lead to any improvement. She presented to the hospital for further evaluation and was found to have right-sided pneumonia. She denies coughing spells for eating but has been told of aspiration in the past.  She reports she has a diagnosis of COPD, prior FEV1 is unknown. She is on bronchodilators that is managed by her primary care physician and she is not known to our practice. She is currently not on oxygen at home. She does report about 10 pound weight loss over the past several months. Previous chest imaging that was done more than a year ago had shown parenchymal abnormality on the right lung. CT scan of the chest suggest presence of pneumonic infiltrates, nodular density as well as interstitial lung disease      Assessment:     Acute hypoxic pulmonary insufficiency  Right-sided pneumonia, rule out aspiration  Underlying COPD, currently not bronchospastic on examination  Interstitial lung disease on CT  Recent weight loss, etiology undetermined  Other medical problems per chart      Recommendations:      On empiric antibiotics  On bronchodilators  O2 titration above 90%   Wean prednisone as tolerated  Follow-up chest imaging in 8 weeks to ensure complete improvement, consider high-resolution CT scan of the chest for categorization of
Physician Progress Note      PATIENT:               Radha Mujica  CSN #:                  [id number]  :                       1945  ADMIT DATE:       2023 4:10 PM  DISCH DATE:  RESPONDING  PROVIDER #: Author Melissa BRADLEY          QUERY TEXT:    Pt noted to have elevated Cr on admission 1.38 which dropped to 0.88 after   IVFs. If possible, please document in the progress notes and discharge   summary if you are evaluating and/or treating any of the following: The medical record reflects the following:  Risk Factors: sepsis    Clinical Indicators:    Cr 1.38-0.88    Treatment: monitor BMP; IVFs    Defined by Kidney Disease Improving Global Outcomes (KDIGO) clinical practice   guideline for acute kidney injury:  -Increase in SCr by greater than or equal to 0.3 mg/dl within 48 hours; or  -Increase or decrease in SCr to greater than or equal to 1.5 times baseline,   which is known or presumed to have occurred within the prior 7 days; or  -Urine volume < 0.5ml/kg/h for 6 hours    Florencia Cruz RN, BSN, [place], CCDS, SMART  Clinical Documentation Improvement  [email or web address]  [phone] or via [employer]  Options provided:  -- Acute kidney failure  -- Acute kidney injury  -- Other - I will add my own diagnosis  -- Disagree - Not applicable / Not valid  -- Disagree - Clinically unable to determine / Unknown  -- Refer to Clinical Documentation Reviewer    PROVIDER RESPONSE TEXT:    This patient is in Acute kidney failure. Query created by: Dontae Gore on 2023 11:01 AM      QUERY TEXT:    Noted documentation of acute respiratory failure in [facility] PNs. Pt with no   conversational dyspnea, no hypoxia noted. In order to support the diagnosis   of acute respiratory failure, please include additional clinical indicators in   your documentation. Or please document if the diagnosis of acute respiratory   failure has been ruled out after further study.     The medical record reflects
Pt has discharge instructions and is calling her ride to come get her. PT is alert and oriented and is going home with family.   PT waiting for family to arrive
Speech LAnguage Pathology EVALUATION/DISCHARGE    Patient: Eddie Galan (38 y.o. female)  Date: 6/1/2023  Primary Diagnosis: Hypoxemia [R09.02]  Acute kidney injury ([facility] [address] [N17.9]  Pneumonia of right upper lobe due to infectious organism [J18.9]  Acute and chr resp failure, unsp w hypoxia or hypercapnia ([address] [J96.20]       Precautions: Fall Risk     ASSESSMENT :  Patient presents with grossly functional oropharyngeal swallow based on bedside assessment across course of breakfast meal. Oral-motor evaluation unremarkable. Timely and efficient oral phase of swallow for consistencies assessed. No overt s/s of aspiration including with system stress via sequential boluses. Occasional wet cough occurred outside of PO trials (e.g., when speaking); patient currently admitted for parainfluenza. Voice and motor speech perceived to be [facility] and unchanged from baseline per patient report. Primary risk factor for aspiration is COPD; however, patient denies prior dysphagia symptoms, prior SLP evaluation, or Hx of aspiration PNA. Recommend PO diet of regular texture with thin liquids with general aspiration precautions. Provided verbal education re: monitoring for fatigue with PO intake in setting of underlying pulmonary disorder; patient verbalized understanding. Patient will be discharged from skilled speech-language pathology services at this time. PLAN :  Recommendations and Planned Interventions:  Diet: Regular and thin liquids (IDDSI 7/0)  Oral medications whole with liquids  Upright for PO intake  Routine oral care  Monitor for fatigue    Acute SLP Services: No, patient will be discharged from acute skilled speech-language pathology at this time. Discharge Recommendations: None     SUBJECTIVE:   Patient stated, What's aspiration? \"    OBJECTIVE:     Past Medical History:   Diagnosis Date    Elevated cholesterol      Past Surgical History:   Procedure Laterality Date    COLONOSCOPY N/A 7/18/2022    COLONOSCOPY
Spiritual Care Assessment/Progress Note  [address]    Name: Cheng Patel MRN: [id number]    Age: 65 y.o. Sex: female   Language: English     Date: 6/2/2023            Total Time Calculated: 85 min              Spiritual Assessment begun in [facility] 2[facility] MED SURG  Service Provided For[de-identified] Patient  Referral/Consult From[de-identified] Nurse  Encounter Overview/Reason : Spiritual/Emotional Needs    Spiritual beliefs:      [x] Involved in a oz tradition/spiritual practice: Maira Rivero      [] Supported by a oz community:      [] Claims no spiritual orientation:      [] Seeking spiritual identity:           [] Adheres to an individual form of spirituality:      [] Not able to assess:                Identified resources for coping and support system:   Support System: Children, Family members, Druze/oz community       [x] Prayer                  [] Devotional reading               [] Music                  [] Guided Imagery     [] Pet visits                                        [x] Other: (COMMENT)     Specific area/focus of visit   Encounter: Type: Follow up  Crisis: Type: Follow up  Spiritual/Emotional needs: Type: Spiritual Support  Ritual, Rites and Sacraments: Type: Restoration Communion  Grief, Loss, and Adjustments:    Ethics/Mediation:    Behavioral Health:    Palliative Care: Advance Care Planning:      Plan/Referrals: Contacted support as requested per patient/family request Plan    Narrative:    Referral source:    responded to a consult request. Cheng Patel at [facility] in [facility] [address]. I reviewed the medical record prior to this encounter. Spiritual Assessment:     Delroy Zapata was sitting up in bed. She expressed excitement to get to go home today. We discussed current feelings/concerns and spiritual perspectives. The patient reported that she grew up Maira Rivero and leans on her oz. We offered prayer together.  She shared about her family the three adult children who have
Spiritual Care Assessment/Progress Note  ST[address]    Name: Kevin Zuniga MRN: [id number]    Age: 65 y.o. Sex: female   Language: English     Date: 5/31/2023            Total Time Calculated: 5 min              Spiritual Assessment begun in [address] [place] [address] 2N MED SURG  Service Provided For[de-identified] Patient  Referral/Consult From[de-identified] Clergy/  Encounter Overview/Reason : Rituals, Rites and Sacraments    Spiritual beliefs:      [x] Involved in a oz tradition/spiritual practice: Sabianist     [x] Supported by a oz community: [place]     [] Claims no spiritual orientation:      [] Seeking spiritual identity:           [] Adheres to an individual form of spirituality:      [] Not able to assess:                Identified resources for coping and support system:   Support System: Children       [x] Prayer                  [] Devotional reading               [x] Music                  [] Guided Imagery     [] Pet visits                                        [] Other: (COMMENT)     Specific area/focus of visit   Encounter: Type: Initial Screen/Assessment  Crisis:    Spiritual/Emotional needs: Type: Spiritual Support  Ritual, Rites and Sacraments: Type: Sabianist Communion  Grief, Loss, and Adjustments:    Ethics/Mediation:    Behavioral Health:    Palliative Care: Advance Care Planning:      Plan/Referrals: Continue to visit, (comment)    Narrative: Mrs. Torito Hunt was in bed. She knows this  because we attend the same parish. Prayer and communion offered. Let her know about the Mass and rosary schedule at [address] [place] [address] and who would be visiting tomorrow. Assured of continued prayers.     ELENA Ayala, RN, ACSW, LCSW   Page:  [phone]
Transition of Care Plan:    RUR: 12% Low  Prior Level of Functioning: Independent  Disposition: Home with home health PT/OT [facility]  If SNF or IPR: Date FOC offered:   Date FOC received:   Accepting facility:   Date authorization started with reference number:   Date authorization received and expires: Follow up appointments: 6/5/23 @ 4:10 pm Dr. Ann Mccray  DME needed: NA  Transportation at discharge: BLS  IM/IMM Medicare/ letter given: 5/30/23  Is patient a  and connected with [facility]? If yes, was [employer] transfer form completed and [facility] notified? Caregiver Contact: Cachorro Bansal [phone]  Discharge Caregiver contacted prior to discharge? Left voice message for Cachorro Bansal [phone]  Care Conference needed? NA  Barriers to discharge: NA    CM notes DC order. Recommendations for home health noted. Referrals placed via careport and so far all providers are out of network or at capacity. CM updated nurse and left message for daughter Cachorro Bansal. CM to monitor. [phone].      4:30 [facility] confirmed acceptance.  PT/OT placed and uploaded via careport. Patient updated via phone call [phone] as she DC home earlier today.      [address] [place] [address] [place], [address]
own diagnosis  -- Disagree - Not applicable / Not valid  -- Disagree - Clinically unable to determine / Unknown  -- Refer to Clinical Documentation Reviewer    PROVIDER RESPONSE TEXT:    This patient has moderate protein calorie malnutrition. Query created by: Galina Escobedo on 6/2/2023 11:53 AM      Electronically signed by:   Markell Ramirez MD 6/2/2023 5:41 PM
reviewed from all clinical/nonclinical/nursing services involved in patient's clinical care. Care coordination discussions were held with appropriate clinical/nonclinical/ nursing providers based on care coordination needs. Labs:     Recent Labs     05/30/23  1328 05/31/23  0602   WBC 13.2* 9.8   HGB 12.6 11.5   HCT 37.7 35.5    149*     Recent Labs     05/30/23  1328 05/31/23  0602    141   K 3.5 4.0    111*   CO2 25 22   BUN 38* 24*   MG  --  2.3     Recent Labs     05/30/23  1328 05/31/23  0602   ALT 47 41   GLOB 4.3* 3.5     No results for input(s): INR, APTT in the last 72 hours. Invalid input(s): PTP   No results for input(s): TIBC, FERR in the last 72 hours. Invalid input(s): FE, PSAT   No results found for: FOL, RBCF   No results for input(s): PH, PCO2, PO2 in the last 72 hours. No results for input(s): CPK in the last 72 hours.     Invalid input(s): CPKMB, CKNDX, TROIQ  No results found for: CHOL, CHOLX, CHLST, CHOLV, HDL, HDLC, LDL, LDLC, TGLX, TRIGL  No results found for: GLUCPOC  [unfilled]      Medications Reviewed:     Current Facility-Administered Medications   Medication Dose Route Frequency    aspirin EC tablet 81 mg  81 mg Oral Daily    atorvastatin (LIPITOR) tablet 20 mg  20 mg Oral Daily    sodium chloride flush 0.9 % injection 5-40 mL  5-40 mL IntraVENous 2 times per day    sodium chloride flush 0.9 % injection 5-40 mL  5-40 mL IntraVENous PRN    0.9 % sodium chloride infusion   IntraVENous PRN    ondansetron (ZOFRAN-ODT) disintegrating tablet 4 mg  4 mg Oral Q8H PRN    Or    ondansetron (ZOFRAN) injection 4 mg  4 mg IntraVENous Q6H PRN    polyethylene glycol (GLYCOLAX) packet 17 g  17 g Oral Daily PRN    heparin (porcine) injection 5,000 Units  5,000 Units SubCUTAneous BID    acetaminophen (TYLENOL) tablet 650 mg  650 mg Oral Q6H PRN    Or    acetaminophen (TYLENOL) suppository 650 mg  650 mg Rectal Q6H PRN    cefTRIAXone (ROCEPHIN) 1,000 mg in sterile water 10 mL IV
skills/brief education completed  Coordination of Nutrition Care: Continue to monitor while inpatient       Goals:     Goals: Meet at least 75% of estimated needs, by next RD assessment       Nutrition Monitoring and Evaluation:   Behavioral-Environmental Outcomes: None Identified  Food/Nutrient Intake Outcomes: Food and Nutrient Intake, Supplement Intake  Physical Signs/Symptoms Outcomes: Biochemical Data, Meal Time Behavior, Nutrition Focused Physical Findings, Weight    Discharge Planning:    Continue Oral Nutrition Supplement     Recent Labs     05/30/23  1328 05/31/23  0602   GLUCOSE 178* 172*   BUN 38* 24*   CREATININE 1.38* 0.88    141   K 3.5 4.0    111*   CO2 25 22   CALCIUM 9.9 9.3   MG  --  2.3       Recent Labs     05/30/23  1328 05/31/23  0602   ALT 47 41   AST 28 23   ALKPHOS 110 115   BILITOT 0.8 0.4   GLOB 4.3* 3.5       No results for input(s): LACTA in the last 72 hours. Recent Labs     05/30/23  1328 05/31/23  0602   WBC 13.2* 9.8   HGB 12.6 11.5   HCT 37.7 35.5    149*       No results for input(s): PREALB in the last 72 hours. No results found for: PREALB    No results for input(s): TRIG in the last 72 hours. No results found for: TRIG    No results for input(s): POCGLU in the last 72 hours. No results found for: HBA1C, [id number]    Iron Profile    No results for input(s): IRON, FOL, VB6LT in the last 72 hours.     Invalid input(s): B12LT  Invalid input(s): NH4    No results found for: IRON, TIBC    No results found for: FERR    B Vitamins  No results found for: FOL, VB6LT    Zinc  No results found for: ZINCLT    Copper  No results found for: COSLT    Selenium  No components found for: SELNLT    Vit C  No components found for: [id number]    Fat Soluble Vitamins    No results found for: VITALT, VITD3, VITEG    No components found for: NH4      [address]NDI LOM[address] RD  Available via [employer]

## 2023-06-02 NOTE — PLAN OF CARE
Problem: Discharge Planning  Goal: Discharge to home or other facility with appropriate resources  6/2/2023 1335 by Diandra Montiel RN  Outcome: Adequate for Discharge  6/2/2023 1335 by Diandra Montiel RN  Outcome: Progressing  6/2/2023 0221 by Rocky Orellana RN  Outcome: Progressing     Problem: Safety - Adult  Goal: Free from fall injury  6/2/2023 1335 by Diandra Montiel RN  Outcome: Adequate for Discharge  6/2/2023 1335 by Diandra Montiel RN  Outcome: Progressing  6/2/2023 0221 by Rocky Orellana RN  Outcome: Progressing     Problem: Nutrition Deficit:  Goal: Optimize nutritional status  6/2/2023 1335 by Diandra Montiel RN  Outcome: Adequate for Discharge  6/2/2023 0221 by Rocky Orellana RN  Outcome: Progressing  Flowsheets (Taken 6/1/2023 1309 by Carson [address] RD)  Nutrient intake appropriate for improving, restoring, or maintaining nutritional needs:   Assess nutritional status and recommend course of action   Monitor oral intake, labs, and treatment plans   Recommend appropriate diets, oral nutritional supplements, and vitamin/mineral supplements

## 2023-06-02 NOTE — DISCHARGE INSTRUCTIONS
Discharge Instructions       PATIENT ID: Sigifredo Roberts  MRN: [id number]   YOB: 1945    DATE OF ADMISSION: 5/30/2023   DATE OF DISCHARGE: 6/2/2023    PRIMARY CARE PROVIDER: Rito Das     ATTENDING PHYSICIAN: Ally Faye MD   DISCHARGING PROVIDER: Ally Faye MD    To contact this individual call [phone] and ask the  to page. If unavailable ask to be transferred the Adult Hospitalist Department. DISCHARGE DIAGNOSES PNA + para flu    CONSULTATIONS: [unfilled]    PROCEDURES/SURGERIES: * No surgery found *    PENDING TEST RESULTS:   At the time of discharge the following test results are still pending:     FOLLOW UP APPOINTMENTS:   [unfilled]     ADDITIONAL CARE RECOMMENDATIONS:     DIET: cardiac diet    ACTIVITY: activity as tolerated    WOUND CARE:     EQUIPMENT needed:       DISCHARGE MEDICATIONS:   See Medication Reconciliation Form    It is important that you take the medication exactly as they are prescribed. Keep your medication in the bottles provided by the pharmacist and keep a list of the medication names, dosages, and times to be taken in your wallet. Do not take other medications without consulting your doctor. NOTIFY YOUR PHYSICIAN FOR ANY OF THE FOLLOWING:   Fever over 101 degrees for 24 hours. Chest pain, shortness of breath, fever, chills, nausea, vomiting, diarrhea, change in mentation, falling, weakness, bleeding. Severe pain or pain not relieved by medications. Or, any other signs or symptoms that you may have questions about.       DISPOSITION:   x Home With:   OT  PT  HH  RN       SNF/Inpatient Rehab/LTAC    Independent/assisted living    Hospice    Other:     CDMP Checked:   Yes x     PROBLEM LIST Updated:  Yes x       Signed:   Ally Faye MD  6/2/2023  9:50 AM

## 2023-06-04 LAB
BACTERIA SPEC CULT: NORMAL
BACTERIA SPEC CULT: NORMAL
SERVICE CMNT-IMP: NORMAL
SERVICE CMNT-IMP: NORMAL

## 2023-07-10 NOTE — PROGRESS NOTES
Noted.   Pulse oximetry assessment   94% at rest on room air (if 88% or less, skip next steps)  92% while ambulating on room air  95% at rest on 2 LPM  95% while ambulating on 2 LPM

## 2024-01-01 NOTE — PROGRESS NOTES
Spiritual Care Assessment/Progress Note  [facility]      NAME: [place]      MRN: [id number]  AGE: 67 y.o. SEX: female  Alevism Affiliation: Mormon   Language: English     4/25/2022     Total Time (in minutes): 5     Spiritual Assessment begun in [facility] through conversation with:         [x]Patient        [x] Family    [] Friend(s)        Reason for Consult: [facility]     Spiritual beliefs: (Please include comment if needed)     [x] Identifies with a robby tradition:Mormon         [x] Supported by a robby community:  Jonah Masterson          [] Claims no spiritual orientation:           [] Seeking spiritual identity:                [] Adheres to an individual form of spirituality:           [] Not able to assess:                           Identified resources for coping:      [x] Prayer                               [x] Music                  [] Guided Imagery     [x] Family/friends                 [] Pet visits     [x] Devotional reading                         [] Unknown     [] Other:                                             Interventions offered during this visit: (See comments for more details)    Patient Interventions: Affirmation of robby,Catharsis/review of pertinent events in supportive environment,Communion (Mormon),Initial/Spiritual assessment, patient floor,Prayer (actual),Prayer (assurance of)     Family/Friend(s):  Affirmation of robby,Communion (Mormon),Prayer (assurance of),Prayer (actual)     Plan of Care:     [x] Support spiritual and/or cultural needs    [] Support AMD and/or advance care planning process      [] Support grieving process   [] Coordinate Rites and/or Rituals    [] Coordination with community clergy   [] No spiritual needs identified at this time   [] Detailed Plan of Care below (See Comments)  [] Make referral to Music Therapy  [] Make referral to Pet Therapy     [] Make referral to Addiction services  [] Make referral to [facility]  [] Make referral to Spiritual Care Partner  [] No future visits requested        [] Contact Spiritual Care for further referrals     Comments:Mrs. Jaspreet Fletcher was sitting up on the edge of her bed. Her daughter was with her. Prayer and communion offered. Mrs. Jaspreet Fletcher shared about the events that led up to her being hospitalized. She is hoping to go home soon.     TRINA Fuchs, RN, ACSW, LCSW   Page:  [phone] Normal for race

## 2024-03-19 ENCOUNTER — HOSPITAL ENCOUNTER (OUTPATIENT)
Facility: HOSPITAL | Age: 79
Discharge: HOME | End: 2024-03-22
Payer: MEDICARE

## 2024-03-19 VITALS — HEIGHT: 67 IN | BODY MASS INDEX: 18.05 KG/M2 | WEIGHT: 115 LBS

## 2024-03-19 DIAGNOSIS — Z12.31 SCREENING MAMMOGRAM FOR HIGH-RISK PATIENT: ICD-10-CM

## 2024-03-19 PROCEDURE — 77063 BREAST TOMOSYNTHESIS BI: CPT

## 2024-10-04 ENCOUNTER — HOSPITAL ENCOUNTER (OUTPATIENT)
Facility: HOSPITAL | Age: 79
Discharge: HOME | End: 2024-10-07
Payer: MEDICARE

## 2024-10-04 DIAGNOSIS — Z72.0 TOBACCO ABUSE DISORDER: ICD-10-CM

## 2024-10-04 PROCEDURE — 71271 CT THORAX LUNG CANCER SCR C-: CPT

## 2024-10-17 ENCOUNTER — TRANSCRIBE ORDERS (OUTPATIENT)
Facility: HOSPITAL | Age: 79
End: 2024-10-17

## 2024-10-17 DIAGNOSIS — I71.20 THORACIC AORTIC ANEURYSM (TAA), UNSPECIFIED PART, UNSPECIFIED WHETHER RUPTURED (HCC): Primary | ICD-10-CM

## 2024-10-28 ENCOUNTER — HOSPITAL ENCOUNTER (OUTPATIENT)
Facility: HOSPITAL | Age: 79
Discharge: HOME | End: 2024-10-31
Payer: MEDICARE

## 2024-10-28 DIAGNOSIS — I71.30 RUPTURED ABDOMINAL AORTIC ANEURYSM (AAA), UNSPECIFIED PART (HCC): ICD-10-CM

## 2024-10-28 PROCEDURE — 76700 US EXAM ABDOM COMPLETE: CPT

## 2024-11-04 ENCOUNTER — HOSPITAL ENCOUNTER (OUTPATIENT)
Facility: HOSPITAL | Age: 79
Discharge: HOME | End: 2024-11-07
Payer: MEDICARE

## 2024-11-04 DIAGNOSIS — I71.20 THORACIC AORTIC ANEURYSM (TAA), UNSPECIFIED PART, UNSPECIFIED WHETHER RUPTURED (HCC): ICD-10-CM

## 2024-11-04 LAB — CREAT BLD-MCNC: 1.2 MG/DL (ref 0.6–1.3)

## 2024-11-04 PROCEDURE — 71275 CT ANGIOGRAPHY CHEST: CPT

## 2024-11-04 PROCEDURE — 6360000004 HC RX CONTRAST MEDICATION: Performed by: RADIOLOGY

## 2024-11-04 PROCEDURE — 82565 ASSAY OF CREATININE: CPT

## 2024-11-04 RX ORDER — IOPAMIDOL 755 MG/ML
100 INJECTION, SOLUTION INTRAVASCULAR
Status: COMPLETED | OUTPATIENT
Start: 2024-11-04 | End: 2024-11-04

## 2024-11-04 RX ADMIN — IOPAMIDOL 100 ML: 755 INJECTION, SOLUTION INTRAVENOUS at 16:14

## 2025-08-19 ENCOUNTER — TRANSCRIBE ORDERS (OUTPATIENT)
Facility: HOSPITAL | Age: 80
End: 2025-08-19

## 2025-08-19 DIAGNOSIS — I71.20 THORACIC AORTIC ANEURYSM (TAA), UNSPECIFIED PART, UNSPECIFIED WHETHER RUPTURED: Primary | ICD-10-CM

## (undated) DEVICE — Device

## (undated) DEVICE — SYR 3ML LL TIP 1/10ML GRAD --

## (undated) DEVICE — TOWEL 4 PLY TISS 19X30 SUE WHT

## (undated) DEVICE — Z DISCONTINUED PER MEDLINE LINE GAS SAMPLING O2/CO2 LNG AD 13 FT NSL W/ TBNG FILTERLINE

## (undated) DEVICE — HYPODERMIC SAFETY NEEDLE: Brand: MAGELLAN

## (undated) DEVICE — SYR 10ML LUER LOK 1/5ML GRAD --

## (undated) DEVICE — BASIN EMSIS 16OZ GRAPHITE PLAS KID SHP MOLD GRAD FOR ORAL

## (undated) DEVICE — NEONATAL-ADULT SPO2 SENSOR: Brand: NELLCOR

## (undated) DEVICE — CATH IV AUTOGRD BC PNK 20GA 25 -- INSYTE

## (undated) DEVICE — SOLIDIFIER FLD 2OZ 1500CC N DISINF IN BTL DISP SAFESORB

## (undated) DEVICE — 1200 GUARD II KIT W/5MM TUBE W/O VAC TUBE: Brand: GUARDIAN

## (undated) DEVICE — ELECTRODE,RADIOTRANSLUCENT,FOAM,5PK: Brand: MEDLINE

## (undated) DEVICE — SET ADMIN 16ML TBNG L100IN 2 Y INJ SITE IV PIGGY BK DISP